# Patient Record
Sex: FEMALE | Race: WHITE | Employment: UNEMPLOYED | URBAN - METROPOLITAN AREA
[De-identification: names, ages, dates, MRNs, and addresses within clinical notes are randomized per-mention and may not be internally consistent; named-entity substitution may affect disease eponyms.]

---

## 2020-01-17 ENCOUNTER — HOSPITAL ENCOUNTER (OUTPATIENT)
Dept: CT IMAGING | Age: 50
Discharge: HOME | End: 2020-01-19
Payer: COMMERCIAL

## 2020-01-17 ENCOUNTER — HOSPITAL ENCOUNTER (OUTPATIENT)
Dept: INTERVENTIONAL RADIOLOGY/VASCULAR | Age: 50
Discharge: HOME | End: 2020-01-19
Payer: COMMERCIAL

## 2020-01-17 VITALS
BODY MASS INDEX: 38.57 KG/M2 | TEMPERATURE: 97.7 F | OXYGEN SATURATION: 100 % | SYSTOLIC BLOOD PRESSURE: 129 MMHG | HEART RATE: 76 BPM | HEIGHT: 66 IN | RESPIRATION RATE: 18 BRPM | DIASTOLIC BLOOD PRESSURE: 63 MMHG | WEIGHT: 240 LBS

## 2020-01-17 PROCEDURE — 6370000000 HC RX 637 (ALT 250 FOR IP): Performed by: RADIOLOGY

## 2020-01-17 PROCEDURE — 7100000031 HC ASPR PHASE II RECOVERY - ADDTL 15 MIN

## 2020-01-17 PROCEDURE — 62284 INJECTION FOR MYELOGRAM: CPT | Performed by: RADIOLOGY

## 2020-01-17 PROCEDURE — 6360000004 HC RX CONTRAST MEDICATION: Performed by: RADIOLOGY

## 2020-01-17 PROCEDURE — 7100000030 HC ASPR PHASE II RECOVERY - FIRST 15 MIN

## 2020-01-17 PROCEDURE — 72126 CT NECK SPINE W/DYE: CPT

## 2020-01-17 PROCEDURE — 72132 CT LUMBAR SPINE W/DYE: CPT

## 2020-01-17 PROCEDURE — 2709999900 IR MYELOGRAM 2 OR MORE REGIONS

## 2020-01-17 RX ORDER — ACETAMINOPHEN 325 MG/1
650 TABLET ORAL EVERY 4 HOURS PRN
Status: DISCONTINUED | OUTPATIENT
Start: 2020-01-17 | End: 2020-01-20 | Stop reason: HOSPADM

## 2020-01-17 RX ORDER — OXYCODONE HYDROCHLORIDE AND ACETAMINOPHEN 5; 325 MG/1; MG/1
1 TABLET ORAL EVERY 4 HOURS PRN
Status: DISCONTINUED | OUTPATIENT
Start: 2020-01-17 | End: 2020-01-20 | Stop reason: HOSPADM

## 2020-01-17 RX ORDER — OXYCODONE HYDROCHLORIDE AND ACETAMINOPHEN 5; 325 MG/1; MG/1
1 TABLET ORAL EVERY 4 HOURS PRN
COMMUNITY

## 2020-01-17 RX ADMIN — IOPAMIDOL 10 ML: 612 INJECTION, SOLUTION INTRATHECAL at 11:04

## 2020-01-17 RX ADMIN — OXYCODONE HYDROCHLORIDE AND ACETAMINOPHEN 1 TABLET: 5; 325 TABLET ORAL at 11:38

## 2020-01-17 ASSESSMENT — PAIN DESCRIPTION - DESCRIPTORS
DESCRIPTORS: ACHING;THROBBING
DESCRIPTORS: ACHING;THROBBING
DESCRIPTORS: ACHING;THROBBING;SHOOTING
DESCRIPTORS: ACHING;THROBBING

## 2020-01-17 ASSESSMENT — PAIN - FUNCTIONAL ASSESSMENT: PAIN_FUNCTIONAL_ASSESSMENT: 0-10

## 2020-01-17 ASSESSMENT — PAIN SCALES - GENERAL
PAINLEVEL_OUTOF10: 9
PAINLEVEL_OUTOF10: 8
PAINLEVEL_OUTOF10: 9
PAINLEVEL_OUTOF10: 8

## 2020-01-17 ASSESSMENT — PAIN DESCRIPTION - PAIN TYPE
TYPE: CHRONIC PAIN

## 2020-01-17 ASSESSMENT — PAIN DESCRIPTION - LOCATION
LOCATION: BACK;BUTTOCKS;NECK
LOCATION: BACK;BUTTOCKS;NECK

## 2020-01-17 NOTE — H&P
HISTORY and [id number]       NAME:  Ivania Mendosa  MRN: [id number]   YOB: 1970   Date: 1/17/2020   Age: 54 y.o. Gender: female       COMPLAINT AND PRESENT HISTORY:     Ivania Mendosa is 54 y.o.,  female, here for IR Myelogram, Cervical and Lumbar. Pt has history of cervical and lumbar degenerative disc disease since age 29. Denies injury, fall or trauma. Pt had lumbar spine surgery x2 (2006 and 2008) with hardware in place. Pt reports not able to have MRI due to hardware. Pt has stimulator in place that she reports is non-functioning to right lumbar area. Pt reports lumbar pain since age 29 with cervical pain beginning about one year ago and has steadily worsened. Rating pain 8/10. Pt takes oxycodone and oxymorphone with moderate relief of pain. Pain is aggravated with any activity. Pain radiates to bilateral shoulders. Pt c/o right sciatic pain intermittently worse with sitting for long periods. Has tried injections and physical therapy with minimal relief. Pt goes to pain management clinic. Pt reports she is not able to work due to pain. No rash, redness or swelling. Denies current chest pain/pressure, palpitations, SOB, recent URI, nausea, vomiting, diarrhea, constipation, fever or chills. PAST MEDICAL HISTORY     Past Medical History:   Diagnosis Date    DDD (degenerative disc disease), cervical     DDD (degenerative disc disease), lumbar     Hypertension     no meds currently       SURGICAL HISTORY       Past Surgical History:   Procedure Laterality Date    BACK SURGERY      lumbar x 2, hardware, cages, fusion    CHOLECYSTECTOMY      TONSILLECTOMY AND ADENOIDECTOMY      TUBAL LIGATION         FAMILY HISTORY     History reviewed. No pertinent family history.     SOCIAL HISTORY       Social History     Socioeconomic History    Marital status:      Spouse name: None    Number of children: None    Years of education: None    Highest education level: None   Occupational History    None   Social Needs    Financial resource strain: None    Food insecurity:     Worry: None     Inability: None    Transportation needs:     Medical: None     Non-medical: None   Tobacco Use    Smoking status: Current Every Day Smoker     Packs/day: 0.50     Years: 30.00     Pack years: 15.00    Smokeless tobacco: Never Used   Substance and Sexual Activity    Alcohol use: Yes     Comment: very rare    Drug use: Never    Sexual activity: None   Lifestyle    Physical activity:     Days per week: None     Minutes per session: None    Stress: None   Relationships    Social connections:     Talks on phone: None     Gets together: None     Attends Buddhism service: None     Active member of club or organization: None     Attends meetings of clubs or organizations: None     Relationship status: None    Intimate partner violence:     Fear of current or ex partner: None     Emotionally abused: None     Physically abused: None     Forced sexual activity: None   Other Topics Concern    None   Social History Narrative    None        REVIEW OF SYSTEMS      No Known Allergies    Current Outpatient Medications on File Prior to Encounter   Medication Sig Dispense Refill    oxyCODONE-acetaminophen (PERCOCET) 5-325 MG per tablet Take 1 tablet by mouth every 4 hours as needed for Pain.  OXYMORPHONE HCL PO Take by mouth 2 times daily       No current facility-administered medications on file prior to encounter. Negative except for what is mentioned in the HPI. GENERAL PHYSICAL EXAM     Vitals: BP (!) 152/100   Pulse 97   Temp 98.2 °F (36.8 °C)   Resp 18   Ht 5' 6\" (1.676 m)   Wt 240 lb (108.9 kg)   SpO2 99%   BMI 38.74 kg/m²  Body mass index is 38.74 kg/m². GENERAL APPEARANCE:   Heriberto Curtis is 54 y.o.,  female, moderately obese, nourished, conscious, alert and anxious. Appears to be in moderate pain.  Does not appear to be in acute

## 2020-01-17 NOTE — OP NOTE
Brief Postoperative Note    Dannielle No  YOB: 1970  [id number]    Pre-operative Diagnosis: back pain    Post-operative Diagnosis: Same    Procedure: myelogram    Anesthesia: Local   Surgeons/Assistants: Watson Nelson MD     Estimated Blood Loss: minimal    Complications: none immediate    Specimens: were not obtained      Electronically signed by Watson Nelson MD on 1/17/2020 at 11:01 AM

## 2020-01-17 NOTE — PROGRESS NOTES
Dr Diya Le arrives, 1% lidocaine injected to lower back and needle inserted under fluoro. 10 ml of Contrast injected. Fluoro images taken. Needle removed and band aid applied. Fluoro images taken and pt placed back onto stretcher cart flat and transported to CT.

## 2020-05-05 ENCOUNTER — HOSPITAL ENCOUNTER (OUTPATIENT)
Dept: CT IMAGING | Age: 50
Discharge: HOME | End: 2020-05-07
Payer: COMMERCIAL

## 2020-05-05 PROCEDURE — 70486 CT MAXILLOFACIAL W/O DYE: CPT

## 2020-05-29 ENCOUNTER — HOSPITAL ENCOUNTER (OUTPATIENT)
Dept: CT IMAGING | Age: 50
Discharge: HOME | End: 2020-05-31
Payer: COMMERCIAL

## 2020-05-29 PROCEDURE — 70486 CT MAXILLOFACIAL W/O DYE: CPT

## 2020-07-17 ENCOUNTER — HOSPITAL ENCOUNTER (OUTPATIENT)
Age: 50
Setting detail: SPECIMEN
Discharge: HOME | End: 2020-07-17
Payer: COMMERCIAL

## 2020-07-21 LAB — SURGICAL PATHOLOGY REPORT: NORMAL

## 2024-05-15 ENCOUNTER — HOSPITAL ENCOUNTER (OUTPATIENT)
Dept: PREADMISSION TESTING | Age: 54
Discharge: HOME | End: 2024-05-15
Payer: COMMERCIAL

## 2024-05-15 VITALS
BODY MASS INDEX: 39.37 KG/M2 | RESPIRATION RATE: 18 BRPM | HEIGHT: 66 IN | DIASTOLIC BLOOD PRESSURE: 78 MMHG | TEMPERATURE: 97.5 F | SYSTOLIC BLOOD PRESSURE: 148 MMHG | OXYGEN SATURATION: 96 % | HEART RATE: 91 BPM | WEIGHT: 245 LBS

## 2024-05-15 LAB
ANION GAP SERPL CALCULATED.3IONS-SCNC: 10 MMOL/L (ref 9–17)
BUN SERPL-MCNC: 12 MG/DL (ref 6–20)
BUN/CREAT SERPL: 17 (ref 9–20)
CALCIUM SERPL-MCNC: 9.4 MG/DL (ref 8.6–10.4)
CHLORIDE SERPL-SCNC: 104 MMOL/L (ref 98–107)
CO2 SERPL-SCNC: 27 MMOL/L (ref 20–31)
CREAT SERPL-MCNC: 0.7 MG/DL (ref 0.5–0.9)
ERYTHROCYTE [DISTWIDTH] IN BLOOD BY AUTOMATED COUNT: 13.6 % (ref 11.8–14.4)
GFR, ESTIMATED: >90 ML/MIN/1.73M2
GLUCOSE SERPL-MCNC: 140 MG/DL (ref 70–99)
HCT VFR BLD AUTO: 44.3 % (ref 36.3–47.1)
HGB BLD-MCNC: 14.1 G/DL (ref 11.9–15.1)
MCH RBC QN AUTO: 29.9 PG (ref 25.2–33.5)
MCHC RBC AUTO-ENTMCNC: 31.8 G/DL (ref 28.4–34.8)
MCV RBC AUTO: 93.9 FL (ref 82.6–102.9)
NRBC BLD-RTO: 0 PER 100 WBC
PLATELET # BLD AUTO: 283 K/UL (ref 138–453)
PMV BLD AUTO: 10.6 FL (ref 8.1–13.5)
POTASSIUM SERPL-SCNC: 4.1 MMOL/L (ref 3.7–5.3)
RBC # BLD AUTO: 4.72 M/UL (ref 3.95–5.11)
SODIUM SERPL-SCNC: 141 MMOL/L (ref 135–144)
WBC OTHER # BLD: 7.4 K/UL (ref 3.5–11.3)

## 2024-05-15 PROCEDURE — 93005 ELECTROCARDIOGRAM TRACING: CPT | Performed by: ANESTHESIOLOGY

## 2024-05-15 PROCEDURE — 85027 COMPLETE CBC AUTOMATED: CPT

## 2024-05-15 PROCEDURE — 80048 BASIC METABOLIC PNL TOTAL CA: CPT

## 2024-05-15 PROCEDURE — 36415 COLL VENOUS BLD VENIPUNCTURE: CPT

## 2024-05-15 RX ORDER — METHOCARBAMOL 750 MG/1
1-2 TABLET, FILM COATED ORAL 2 TIMES DAILY
COMMUNITY

## 2024-05-15 RX ORDER — OXYCODONE HYDROCHLORIDE 15 MG/1
15 TABLET ORAL EVERY 4 HOURS PRN
COMMUNITY

## 2024-05-15 RX ORDER — OXYMETAZOLINE HYDROCHLORIDE 0.05 G/100ML
2 SPRAY NASAL ONCE
OUTPATIENT
Start: 2024-05-29 | End: 2024-05-31

## 2024-05-15 ASSESSMENT — PAIN DESCRIPTION - FREQUENCY: FREQUENCY: CONTINUOUS

## 2024-05-15 ASSESSMENT — PAIN DESCRIPTION - PAIN TYPE: TYPE: CHRONIC PAIN

## 2024-05-15 ASSESSMENT — PAIN SCALES - GENERAL: PAINLEVEL_OUTOF10: 7

## 2024-05-15 ASSESSMENT — PAIN DESCRIPTION - LOCATION: LOCATION: BACK;NECK

## 2024-05-15 ASSESSMENT — PAIN DESCRIPTION - DESCRIPTORS: DESCRIPTORS: ACHING

## 2024-05-15 NOTE — PRE-PROCEDURE INSTRUCTIONS
ARRIVE AT THE HOSPITAL ON Wednesday, 5/29/24 Dr Mcpherson's office to notify the patient of arrival time    Once you enter the hospital lobby, take the elevators to the second floor.  Check-In is at the surgery registration desk.      Continue to take your home medications as you normally do up to and including the night before surgery with the exception of any blood thinning medications.      Blood Thinning Medications:  Please stop prescription blood thinning medications such as Apixaban (Eliquis); Clopidogrel (Plavix); Dabigatran (Pradaxa); Prasugrel (Effient); Rivaroxaban (Xarelto); Ticagrelor (Brilinta); Warfarin (Coumadin) only as directed by your surgeon and/or the prescribing physician    Some common examples of other medications that can thin your blood are: Aspirin, Ibuprofen (Advil, Motrin), Naproxen (Aleve), Meloxicam (Mobic), Celecoxib (Celebrex),Excedrin, Fish Oil, many Herbal Supplements.  These medications should usually be stopped at least 7 days prior to surgery.    None    Tylenol is OK to take for pain the week prior to surgery.    Failure to stop certain medications may interfere with your scheduled surgery.    If you receive instructions from your surgeon regarding what medications to stop prior to surgery, please follow those specific instructions.      If You Have Diabetes:  Do not take any of your diabetic medications, (injectables or by mouth) the morning of surgery unless otherwise instructed by the doctor who manages your diabetes. If you are taking insulin, contact the doctor the manages your diabetes for instructions about any changes to your insulin dosages the day before surgery.        Please take the following medication(s) the day of surgery with a small sip of water:  Patient normally takes methocarbamol and oxycodone together when she gets up in the morning or she is not able to move. Instructed to take as normal    Please use your inhaler(s) if needed and bring your inhaler(s)  from home the day of surgery      PREPARING FOR YOUR SURGERY:     Before surgery, you can play an important role in your own health. Because skin is not sterile, we need to be sure that your skin is as free of germs as possible before surgery by carefully washing before surgery.  Preparing or “prepping” skin before surgery can reduce the risk of a “surgical site infection.”  Do not shave the area of your body where your surgery will be performed unless you received specific permission from your physician.    Preoperative Bathing Instructions  Bathe or shower the night before and morning of surgery. Use antibacterial soap- Dial  Wear clean clothing after bathing.  Shampoo hair before surgery  Keep nails clean    Do not apply lotion/creams/oils; cosmetics; perfumes/aftershave; deodorant; alcohol based hair or skin products the day of surgery.  Do not shave the area of your body where your surgery will be performed unless you receive specific permission from your surgeon.       Patient Instructions:    If you are having any type of anesthesia you are to have nothing to eat or drink after midnight the night before your surgery.  This includes gum, hard candy, mints, water or smoking or chewing tobacco.  The only exception to this is a small sip of water to take the medications listed above on the morning of surgery.  No alcoholic beverages for 24 hours prior to surgery.    You may brush your teeth but do not swallow the water.    Bring your eyeglasses and case with you.  No contacts are to be worn the day of surgery.  You also may bring your hearing aids.  Most surgical procedures involving anesthesia will require that you remove your dentures prior to surgery.    If you are on C-PAP or Bi-PAP at home and plan on staying in the hospital overnight for your surgery please bring the machine with you.    Do not wear any jewelry or body piercings day of surgery.  No nail polish on the operative extremity (arm/leg

## 2024-05-16 LAB
EKG ATRIAL RATE: 78 BPM
EKG P AXIS: 70 DEGREES
EKG P-R INTERVAL: 160 MS
EKG Q-T INTERVAL: 380 MS
EKG QRS DURATION: 88 MS
EKG QTC CALCULATION (BAZETT): 433 MS
EKG R AXIS: 53 DEGREES
EKG T AXIS: 47 DEGREES
EKG VENTRICULAR RATE: 78 BPM

## 2024-05-29 ENCOUNTER — ANESTHESIA (OUTPATIENT)
Dept: OPERATING ROOM | Age: 54
End: 2024-05-29
Payer: COMMERCIAL

## 2024-05-29 ENCOUNTER — ANESTHESIA EVENT (OUTPATIENT)
Dept: OPERATING ROOM | Age: 54
End: 2024-05-29
Payer: COMMERCIAL

## 2024-05-29 ENCOUNTER — HOSPITAL ENCOUNTER (OUTPATIENT)
Age: 54
Setting detail: OUTPATIENT SURGERY
Discharge: HOME | End: 2024-05-29
Attending: OTOLARYNGOLOGY | Admitting: OTOLARYNGOLOGY
Payer: COMMERCIAL

## 2024-05-29 VITALS
BODY MASS INDEX: 39.37 KG/M2 | SYSTOLIC BLOOD PRESSURE: 152 MMHG | WEIGHT: 245 LBS | HEIGHT: 66 IN | RESPIRATION RATE: 13 BRPM | TEMPERATURE: 97.9 F | HEART RATE: 82 BPM | OXYGEN SATURATION: 94 % | DIASTOLIC BLOOD PRESSURE: 80 MMHG

## 2024-05-29 DIAGNOSIS — J32.3 CHRONIC SPHENOIDAL SINUSITIS: ICD-10-CM

## 2024-05-29 DIAGNOSIS — G89.18 POSTOPERATIVE PAIN: Primary | ICD-10-CM

## 2024-05-29 DIAGNOSIS — J33.9 NASAL POLYP: ICD-10-CM

## 2024-05-29 DIAGNOSIS — J32.2 SINUSITIS CHRONIC, ETHMOIDAL: ICD-10-CM

## 2024-05-29 DIAGNOSIS — J32.1 CHRONIC FRONTAL SINUSITIS: ICD-10-CM

## 2024-05-29 DIAGNOSIS — J32.0 CHRONIC MAXILLARY SINUSITIS: ICD-10-CM

## 2024-05-29 LAB — HCG, PREGNANCY URINE (POC): NEGATIVE

## 2024-05-29 PROCEDURE — 6360000002 HC RX W HCPCS: Performed by: OTOLARYNGOLOGY

## 2024-05-29 PROCEDURE — 3600000012 HC SURGERY LEVEL 2 ADDTL 15MIN: Performed by: OTOLARYNGOLOGY

## 2024-05-29 PROCEDURE — 88311 DECALCIFY TISSUE: CPT

## 2024-05-29 PROCEDURE — 3600000002 HC SURGERY LEVEL 2 BASE: Performed by: OTOLARYNGOLOGY

## 2024-05-29 PROCEDURE — 6370000000 HC RX 637 (ALT 250 FOR IP): Performed by: OTOLARYNGOLOGY

## 2024-05-29 PROCEDURE — 7100000010 HC PHASE II RECOVERY - FIRST 15 MIN: Performed by: OTOLARYNGOLOGY

## 2024-05-29 PROCEDURE — 6360000002 HC RX W HCPCS: Performed by: ANESTHESIOLOGY

## 2024-05-29 PROCEDURE — 2500000003 HC RX 250 WO HCPCS: Performed by: OTOLARYNGOLOGY

## 2024-05-29 PROCEDURE — 3700000001 HC ADD 15 MINUTES (ANESTHESIA): Performed by: OTOLARYNGOLOGY

## 2024-05-29 PROCEDURE — 7100000000 HC PACU RECOVERY - FIRST 15 MIN: Performed by: OTOLARYNGOLOGY

## 2024-05-29 PROCEDURE — 3700000000 HC ANESTHESIA ATTENDED CARE: Performed by: OTOLARYNGOLOGY

## 2024-05-29 PROCEDURE — 6360000002 HC RX W HCPCS: Performed by: SPECIALIST

## 2024-05-29 PROCEDURE — 6370000000 HC RX 637 (ALT 250 FOR IP): Performed by: ANESTHESIOLOGY

## 2024-05-29 PROCEDURE — 7100000001 HC PACU RECOVERY - ADDTL 15 MIN: Performed by: OTOLARYNGOLOGY

## 2024-05-29 PROCEDURE — 2500000003 HC RX 250 WO HCPCS: Performed by: SPECIALIST

## 2024-05-29 PROCEDURE — 2580000003 HC RX 258: Performed by: ANESTHESIOLOGY

## 2024-05-29 PROCEDURE — 7100000011 HC PHASE II RECOVERY - ADDTL 15 MIN: Performed by: OTOLARYNGOLOGY

## 2024-05-29 PROCEDURE — 81025 URINE PREGNANCY TEST: CPT

## 2024-05-29 PROCEDURE — 2720000010 HC SURG SUPPLY STERILE: Performed by: OTOLARYNGOLOGY

## 2024-05-29 PROCEDURE — 88305 TISSUE EXAM BY PATHOLOGIST: CPT

## 2024-05-29 PROCEDURE — 2709999900 HC NON-CHARGEABLE SUPPLY: Performed by: OTOLARYNGOLOGY

## 2024-05-29 RX ORDER — MEPERIDINE HYDROCHLORIDE 50 MG/ML
12.5 INJECTION INTRAMUSCULAR; INTRAVENOUS; SUBCUTANEOUS EVERY 5 MIN PRN
Status: DISCONTINUED | OUTPATIENT
Start: 2024-05-29 | End: 2024-05-29 | Stop reason: HOSPADM

## 2024-05-29 RX ORDER — METOCLOPRAMIDE HYDROCHLORIDE 5 MG/ML
10 INJECTION INTRAMUSCULAR; INTRAVENOUS
Status: DISCONTINUED | OUTPATIENT
Start: 2024-05-29 | End: 2024-05-29 | Stop reason: HOSPADM

## 2024-05-29 RX ORDER — FENTANYL CITRATE 50 UG/ML
INJECTION, SOLUTION INTRAMUSCULAR; INTRAVENOUS PRN
Status: DISCONTINUED | OUTPATIENT
Start: 2024-05-29 | End: 2024-05-29 | Stop reason: SDUPTHER

## 2024-05-29 RX ORDER — ONDANSETRON 2 MG/ML
INJECTION INTRAMUSCULAR; INTRAVENOUS PRN
Status: DISCONTINUED | OUTPATIENT
Start: 2024-05-29 | End: 2024-05-29 | Stop reason: SDUPTHER

## 2024-05-29 RX ORDER — OXYCODONE HYDROCHLORIDE 5 MG/1
5 TABLET ORAL
Status: COMPLETED | OUTPATIENT
Start: 2024-05-29 | End: 2024-05-29

## 2024-05-29 RX ORDER — LIDOCAINE HYDROCHLORIDE 10 MG/ML
1 INJECTION, SOLUTION EPIDURAL; INFILTRATION; INTRACAUDAL; PERINEURAL
Status: DISCONTINUED | OUTPATIENT
Start: 2024-05-29 | End: 2024-05-29 | Stop reason: HOSPADM

## 2024-05-29 RX ORDER — NALOXONE HYDROCHLORIDE 0.4 MG/ML
INJECTION, SOLUTION INTRAMUSCULAR; INTRAVENOUS; SUBCUTANEOUS PRN
Status: DISCONTINUED | OUTPATIENT
Start: 2024-05-29 | End: 2024-05-29 | Stop reason: HOSPADM

## 2024-05-29 RX ORDER — MIDAZOLAM HYDROCHLORIDE 1 MG/ML
INJECTION INTRAMUSCULAR; INTRAVENOUS PRN
Status: DISCONTINUED | OUTPATIENT
Start: 2024-05-29 | End: 2024-05-29 | Stop reason: SDUPTHER

## 2024-05-29 RX ORDER — LIDOCAINE HYDROCHLORIDE AND EPINEPHRINE 10; 10 MG/ML; UG/ML
INJECTION, SOLUTION INFILTRATION; PERINEURAL PRN
Status: DISCONTINUED | OUTPATIENT
Start: 2024-05-29 | End: 2024-05-29 | Stop reason: ALTCHOICE

## 2024-05-29 RX ORDER — SODIUM CHLORIDE, SODIUM LACTATE, POTASSIUM CHLORIDE, CALCIUM CHLORIDE 600; 310; 30; 20 MG/100ML; MG/100ML; MG/100ML; MG/100ML
INJECTION, SOLUTION INTRAVENOUS CONTINUOUS
Status: DISCONTINUED | OUTPATIENT
Start: 2024-05-29 | End: 2024-05-29 | Stop reason: HOSPADM

## 2024-05-29 RX ORDER — SODIUM CHLORIDE 9 MG/ML
INJECTION, SOLUTION INTRAVENOUS PRN
Status: DISCONTINUED | OUTPATIENT
Start: 2024-05-29 | End: 2024-05-29 | Stop reason: HOSPADM

## 2024-05-29 RX ORDER — OXYMETAZOLINE HYDROCHLORIDE 0.05 G/100ML
2 SPRAY NASAL ONCE
Status: COMPLETED | OUTPATIENT
Start: 2024-05-29 | End: 2024-05-29

## 2024-05-29 RX ORDER — HYDROMORPHONE HYDROCHLORIDE 1 MG/ML
0.5 INJECTION, SOLUTION INTRAMUSCULAR; INTRAVENOUS; SUBCUTANEOUS EVERY 5 MIN PRN
Status: DISCONTINUED | OUTPATIENT
Start: 2024-05-29 | End: 2024-05-29 | Stop reason: HOSPADM

## 2024-05-29 RX ORDER — PROPOFOL 10 MG/ML
INJECTION, EMULSION INTRAVENOUS PRN
Status: DISCONTINUED | OUTPATIENT
Start: 2024-05-29 | End: 2024-05-29 | Stop reason: SDUPTHER

## 2024-05-29 RX ORDER — ROCURONIUM BROMIDE 10 MG/ML
INJECTION, SOLUTION INTRAVENOUS PRN
Status: DISCONTINUED | OUTPATIENT
Start: 2024-05-29 | End: 2024-05-29 | Stop reason: SDUPTHER

## 2024-05-29 RX ORDER — SODIUM CHLORIDE 0.9 % (FLUSH) 0.9 %
5-40 SYRINGE (ML) INJECTION EVERY 12 HOURS SCHEDULED
Status: DISCONTINUED | OUTPATIENT
Start: 2024-05-29 | End: 2024-05-29 | Stop reason: HOSPADM

## 2024-05-29 RX ORDER — LIDOCAINE HYDROCHLORIDE 20 MG/ML
INJECTION, SOLUTION EPIDURAL; INFILTRATION; INTRACAUDAL; PERINEURAL PRN
Status: DISCONTINUED | OUTPATIENT
Start: 2024-05-29 | End: 2024-05-29 | Stop reason: SDUPTHER

## 2024-05-29 RX ORDER — SODIUM CHLORIDE 9 MG/ML
INJECTION, SOLUTION INTRAVENOUS CONTINUOUS
Status: DISCONTINUED | OUTPATIENT
Start: 2024-05-29 | End: 2024-05-29 | Stop reason: HOSPADM

## 2024-05-29 RX ORDER — SODIUM CHLORIDE 0.9 % (FLUSH) 0.9 %
5-40 SYRINGE (ML) INJECTION PRN
Status: DISCONTINUED | OUTPATIENT
Start: 2024-05-29 | End: 2024-05-29 | Stop reason: HOSPADM

## 2024-05-29 RX ORDER — FENTANYL CITRATE 50 UG/ML
25 INJECTION, SOLUTION INTRAMUSCULAR; INTRAVENOUS EVERY 5 MIN PRN
Status: DISCONTINUED | OUTPATIENT
Start: 2024-05-29 | End: 2024-05-29 | Stop reason: HOSPADM

## 2024-05-29 RX ORDER — OXYMETAZOLINE HYDROCHLORIDE 0.05 G/100ML
2 SPRAY NASAL ONCE
Status: DISCONTINUED | OUTPATIENT
Start: 2024-05-29 | End: 2024-05-29

## 2024-05-29 RX ORDER — DEXAMETHASONE SODIUM PHOSPHATE 10 MG/ML
INJECTION, SOLUTION INTRAMUSCULAR; INTRAVENOUS PRN
Status: DISCONTINUED | OUTPATIENT
Start: 2024-05-29 | End: 2024-05-29 | Stop reason: SDUPTHER

## 2024-05-29 RX ORDER — TRIAMCINOLONE ACETONIDE 40 MG/ML
INJECTION, SUSPENSION INTRA-ARTICULAR; INTRAMUSCULAR PRN
Status: DISCONTINUED | OUTPATIENT
Start: 2024-05-29 | End: 2024-05-29 | Stop reason: ALTCHOICE

## 2024-05-29 RX ORDER — DIPHENHYDRAMINE HYDROCHLORIDE 50 MG/ML
12.5 INJECTION INTRAMUSCULAR; INTRAVENOUS
Status: DISCONTINUED | OUTPATIENT
Start: 2024-05-29 | End: 2024-05-29 | Stop reason: HOSPADM

## 2024-05-29 RX ORDER — OXYCODONE HYDROCHLORIDE AND ACETAMINOPHEN 5; 325 MG/1; MG/1
1 TABLET ORAL EVERY 6 HOURS PRN
Qty: 28 TABLET | Refills: 0 | Status: SHIPPED | OUTPATIENT
Start: 2024-05-29 | End: 2024-06-05

## 2024-05-29 RX ORDER — CLARITHROMYCIN 500 MG/1
500 TABLET, COATED ORAL 2 TIMES DAILY
Qty: 20 TABLET | Refills: 0 | Status: SHIPPED | OUTPATIENT
Start: 2024-05-29 | End: 2024-06-08

## 2024-05-29 RX ORDER — PROCHLORPERAZINE EDISYLATE 5 MG/ML
10 INJECTION INTRAMUSCULAR; INTRAVENOUS
Status: DISCONTINUED | OUTPATIENT
Start: 2024-05-29 | End: 2024-05-29 | Stop reason: HOSPADM

## 2024-05-29 RX ADMIN — MIDAZOLAM 2 MG: 1 INJECTION INTRAMUSCULAR; INTRAVENOUS at 08:38

## 2024-05-29 RX ADMIN — ONDANSETRON 4 MG: 2 INJECTION INTRAMUSCULAR; INTRAVENOUS at 09:15

## 2024-05-29 RX ADMIN — HYDROMORPHONE HYDROCHLORIDE 0.5 MG: 1 INJECTION, SOLUTION INTRAMUSCULAR; INTRAVENOUS; SUBCUTANEOUS at 10:32

## 2024-05-29 RX ADMIN — FENTANYL CITRATE 100 MCG: 50 INJECTION INTRAMUSCULAR; INTRAVENOUS at 08:43

## 2024-05-29 RX ADMIN — FENTANYL CITRATE 25 MCG: 50 INJECTION INTRAMUSCULAR; INTRAVENOUS at 09:27

## 2024-05-29 RX ADMIN — ROCURONIUM BROMIDE 50 MG: 10 INJECTION, SOLUTION INTRAVENOUS at 08:43

## 2024-05-29 RX ADMIN — Medication 2000 MG: at 08:53

## 2024-05-29 RX ADMIN — PROPOFOL 200 MG: 10 INJECTION, EMULSION INTRAVENOUS at 08:43

## 2024-05-29 RX ADMIN — SUGAMMADEX 200 MG: 100 INJECTION, SOLUTION INTRAVENOUS at 09:32

## 2024-05-29 RX ADMIN — OXYCODONE HYDROCHLORIDE 5 MG: 5 TABLET ORAL at 10:49

## 2024-05-29 RX ADMIN — DEXAMETHASONE SODIUM PHOSPHATE 10 MG: 10 INJECTION INTRAMUSCULAR; INTRAVENOUS at 08:44

## 2024-05-29 RX ADMIN — HYDROMORPHONE HYDROCHLORIDE 0.5 MG: 1 INJECTION, SOLUTION INTRAMUSCULAR; INTRAVENOUS; SUBCUTANEOUS at 10:22

## 2024-05-29 RX ADMIN — SODIUM CHLORIDE, POTASSIUM CHLORIDE, SODIUM LACTATE AND CALCIUM CHLORIDE: 600; 310; 30; 20 INJECTION, SOLUTION INTRAVENOUS at 07:07

## 2024-05-29 RX ADMIN — Medication 2 SPRAY: at 07:32

## 2024-05-29 RX ADMIN — LIDOCAINE HYDROCHLORIDE 100 MG: 20 INJECTION, SOLUTION EPIDURAL; INFILTRATION; INTRACAUDAL; PERINEURAL at 08:43

## 2024-05-29 ASSESSMENT — PAIN - FUNCTIONAL ASSESSMENT
PAIN_FUNCTIONAL_ASSESSMENT: 0-10
PAIN_FUNCTIONAL_ASSESSMENT: FACE, LEGS, ACTIVITY, CRY, AND CONSOLABILITY (FLACC)

## 2024-05-29 ASSESSMENT — PAIN DESCRIPTION - DESCRIPTORS
DESCRIPTORS: THROBBING

## 2024-05-29 ASSESSMENT — PAIN SCALES - GENERAL
PAINLEVEL_OUTOF10: 8
PAINLEVEL_OUTOF10: 8
PAINLEVEL_OUTOF10: 9
PAINLEVEL_OUTOF10: 6

## 2024-05-29 ASSESSMENT — LIFESTYLE VARIABLES: SMOKING_STATUS: 1

## 2024-05-29 ASSESSMENT — PAIN DESCRIPTION - LOCATION
LOCATION: NOSE

## 2024-05-29 NOTE — ANESTHESIA PRE PROCEDURE
Department of Anesthesiology  Preprocedure Note       Name:  Dannielle Bustillo   Age:  53 y.o.  :  1970                                          MRN:  [id number]         Date:  2024      Surgeon: Surgeon(s):  Monet Mcpherson MD    Procedure: Procedure(s):  SINUS ENDOSCOPY FUNCTIONAL SURGERY- WITH NAVIGATION- ETHMOIDECTOMY, MAXILLARY ANTROSTOMY, FRONTAL SINUS EXPLORATION, SPHENOIDOTOMY, POLPYTECTOMY    Medications prior to admission:   Prior to Admission medications    Medication Sig Start Date End Date Taking? Authorizing Provider   oxyCODONE (OXY-IR) 15 MG immediate release tablet Take 1 tablet by mouth every 4 hours as needed for Pain.    ProviderCedrick MD   methocarbamol (ROBAXIN) 750 MG tablet Take 1-2 tablets by mouth in the morning and at bedtime    ProviderCedrick MD       Current medications:    Current Facility-Administered Medications   Medication Dose Route Frequency Provider Last Rate Last Admin   • lidocaine PF 1 % injection 1 mL  1 mL IntraDERmal Once PRN Alta Bergeron MD       • 0.9 % sodium chloride infusion   IntraVENous Continuous Alta Bergeron MD       • lactated ringers IV soln infusion   IntraVENous Continuous Alta Bergeron  mL/hr at 24 0707 New Bag at 24 0707   • sodium chloride flush 0.9 % injection 5-40 mL  5-40 mL IntraVENous 2 times per day Alta Bergeron MD       • sodium chloride flush 0.9 % injection 5-40 mL  5-40 mL IntraVENous PRN Alta Bergeron MD       • 0.9 % sodium chloride infusion   IntraVENous PRN Alta Bergeron MD       • ceFAZolin (ANCEF) 2000 mg in 0.9% sodium chloride 50 mL IVPB  2,000 mg IntraVENous Once Monet Mcpherson MD           Allergies:  No Known Allergies    Problem List:  There is no problem list on file for this patient.      Past Medical History:        Diagnosis Date   • DDD (degenerative disc disease), cervical    • DDD (degenerative disc disease), lumbar    • Hypertension     Resolved, no meds currently   • Loss of

## 2024-05-29 NOTE — DISCHARGE INSTRUCTIONS
products.  Avoid travel out of your home area until healing is completed.    Diet  Eat a light diet over the first few days following surgery.  Drink plenty of fluids  Many patients experience some nausea and vomiting after general anesthesia.  If this occurs, restrict yourself to fluids for 24 to 48 hours.  If nausea or vomiting occurs longer than 24 to 48 hours call the office.    Medications  Most patients are given medications for the following:  An antibiotic - use as directed until the prescription is finished.  For pain - Use as directed, only as needed.  You may use Tylenol.  DO NOT use aspirin or aspirin containing products. Or non-steriodal anti-inflammatory medications.  A nasal decongestant spray - Use this as directed for 5 days only.  For fever - do not use aspirin!  Use Tylenol if needed.  For constipation - Use a stool softener and mild laxative, as needed to avoid straining.  Occasionally a short burst of steroids (prednisone) will be prescribed.    Nasal Irrigations  Begin irrigating the nose when instructed to do so by the physician, using the separate instructions for nasal irrigations.    CALL US IF YOU HAVE ANY OF THE FOLLOWING  Temperature of over 102 F  Visual problems or swelling or bulging of the eyes  Neck stiffness with headaches and fever  Excessive drowsiness or confusion  Profuse nosebleed you cant control.

## 2024-05-29 NOTE — H&P
History and Physical Service   [facility]    HISTORY AND PHYSICAL EXAMINATION            Date of Evaluation: 5/29/2024  Patient name:  Dannielle Bustillo  MRN:   [id number]  YOB: 1970  PCP:    Gil Black MD    History Obtained From:     Patient, medical records    History of Present Illness:     This is Dannielle Bustillo a 53 y.o. female who presents today for a SINUS ENDOSCOPY FUNCTIONAL SURGERY- WITH NAVIGATION- ETHMOIDECTOMY, MAXILLARY ANTROSTOMY, FRONTAL SINUS EXPLORATION, SPHENOIDOTOMY, POLPYTECTOMY by Monet Mcpherson MD for Chronic maxillary sinusitis; Sinusitis chronic, ethmoidal; Chronic frontal*. Patient has history of chronic sinusitis and has had multiple previous sinus surgeries and debridements with last being in September 2022 in the operating room and one in an outpatient setting approximately one year ago. Currently her chief complaint is complete loss of taste and smell. Denies fever, chills, shortness of breath, cough, congestion, wheezing, chest pain, open sores or wounds. Denies hx of diabetes. Denies any current blood thinning medications.     Past Medical History:     Past Medical History:   Diagnosis Date    DDD (degenerative disc disease), cervical     DDD (degenerative disc disease), lumbar     Hypertension     Resolved, no meds currently    Loss of perception for taste     loss of smell    Nasal congestion     Nasal polyps     Spinal cord stimulator status     implanted        Past Surgical History:     Past Surgical History:   Procedure Laterality Date    BACK SURGERY      lumbar x 2, hardware, cages, fusion    CHOLECYSTECTOMY      NASAL POLYP SURGERY      had at least 4 nasal surgeries to remove polyps    TONSILLECTOMY AND ADENOIDECTOMY      TUBAL LIGATION      WISDOM TOOTH EXTRACTION          Medications Prior to Admission:     Prior to Admission medications    Medication Sig Start Date End Date Taking? Authorizing Provider   oxyCODONE (OXY-IR) 15 MG

## 2024-05-29 NOTE — ANESTHESIA POSTPROCEDURE EVALUATION
Department of Anesthesiology  Postprocedure Note    Patient: Dannielle Bustillo  MRN: [id number]  YOB: 1970  Date of evaluation: 5/29/2024    Procedure Summary       Date: 05/29/24 Room / Location: [address]    Anesthesia Start: 0838 Anesthesia Stop: 0948    Procedure: SINUS ENDOSCOPY FUNCTIONAL SURGERY- WITH NAVIGATION- ETHMOIDECTOMY, MAXILLARY ANTROSTOMY, FRONTAL SINUS EXPLORATION, SPHENOIDOTOMY, POLPYTECTOMY (Nose) Diagnosis:       Chronic maxillary sinusitis      Sinusitis chronic, ethmoidal      Chronic frontal sinusitis      Chronic sphenoidal sinusitis      Nasal polyp      (Chronic maxillary sinusitis [J32.0])      (Sinusitis chronic, ethmoidal [J32.2])      (Chronic frontal sinusitis [J32.1])      (Chronic sphenoidal sinusitis [J32.3])      (Nasal polyp [J33.9])    Surgeons: Monet Mcpherson MD Responsible Provider: Laura Ordoñez MD    Anesthesia Type: General ASA Status: 2            Anesthesia Type: General    Aminah Phase I: Aminah Score: 10    Aminah Phase II: Aminah Score: 10    Anesthesia Post Evaluation    Patient location during evaluation: PACU  Patient participation: complete - patient participated  Level of consciousness: awake and alert  Airway patency: patent  Nausea & Vomiting: no nausea and no vomiting  Cardiovascular status: hemodynamically stable  Respiratory status: acceptable  Hydration status: euvolemic  Pain management: adequate    No notable events documented.

## 2024-05-29 NOTE — OP NOTE
identified in the preoperative holding area and brought to the operating room.  The patient was prepped and draped in usual fashion.  The navigation equipment was brought into the field and landmarks were mapped.  The accuracy was verified.  The sinonasal passages were injected with lidocaine with epinephrine.  Afrin was applied to the nasal passages.    A combination of  scopes were used to examine the sinonasal passages.  The backbiting, upbiting, and straight biting forceps were used in the sinus passages to remove tissue.  The microdebrider was also used.    The inferior turbinates on the right and left side were incised and a submucous  resection was done.  The inferior turbinates were then outfractured.      The right maxillary sinus was seen after an uncinectomy was performed.  The curved suction was inserted into the maxillary sinus to suction out the blood and mucus.  The diseased tissue was removed.  The anterior and posterior ethmoid air cells were then sequentially entered.  The diseased tissue and mucus was removed.  The face of the sphenoid was identified and the sinus was entered.  The diseased tissue was removed. The frontal sinus tract was identified and the diseases tissue was addressed.    The left maxillary sinus was seen after an uncinectomy was performed.  The curved suction was inserted into the maxillary sinus to suction out the blood and mucus.  The diseased tissue was removed.  The anterior and posterior ethmoid air cells were then sequentially entered.  The diseased tissue and mucus was removed.  The face of the sphenoid was identified and the sinus was entered.  The diseased tissue was removed. The frontal sinus tract was identified and the diseases tissue was addressed.    The sinus passages were packed with nasopore soaked in Kenalog 40mg.  Now the nasal airway was patent bilaterally. The patient tolerated the procedure well and was returned to the recovery room in satisfactory

## 2024-05-30 LAB — SURGICAL PATHOLOGY REPORT: NORMAL

## 2024-08-30 ENCOUNTER — TELEPHONE (OUTPATIENT)
Age: 54
End: 2024-08-30

## 2024-10-14 ENCOUNTER — PREP FOR PROCEDURE (OUTPATIENT)
Age: 54
End: 2024-10-14

## 2024-10-14 DIAGNOSIS — Z12.12 ENCOUNTER FOR SCREENING FOR COLORECTAL MALIGNANT NEOPLASM: ICD-10-CM

## 2024-10-14 DIAGNOSIS — Z12.11 ENCOUNTER FOR SCREENING FOR COLORECTAL MALIGNANT NEOPLASM: ICD-10-CM

## 2024-11-09 ENCOUNTER — HOSPITAL ENCOUNTER (INPATIENT)
Age: 54
LOS: 4 days | Discharge: HOME | DRG: 189 | End: 2024-11-14
Attending: EMERGENCY MEDICINE | Admitting: STUDENT IN AN ORGANIZED HEALTH CARE EDUCATION/TRAINING PROGRAM
Payer: COMMERCIAL

## 2024-11-09 ENCOUNTER — APPOINTMENT (OUTPATIENT)
Dept: GENERAL RADIOLOGY | Age: 54
DRG: 189 | End: 2024-11-09
Payer: COMMERCIAL

## 2024-11-09 ENCOUNTER — APPOINTMENT (OUTPATIENT)
Dept: CT IMAGING | Age: 54
DRG: 189 | End: 2024-11-09
Payer: COMMERCIAL

## 2024-11-09 DIAGNOSIS — J33.9 TRIAD ASTHMA: ICD-10-CM

## 2024-11-09 DIAGNOSIS — R00.0 TACHYCARDIA: ICD-10-CM

## 2024-11-09 DIAGNOSIS — R06.02 SHORTNESS OF BREATH: Primary | ICD-10-CM

## 2024-11-09 DIAGNOSIS — R06.09 DYSPNEA ON EXERTION: ICD-10-CM

## 2024-11-09 DIAGNOSIS — Z88.6 TRIAD ASTHMA: ICD-10-CM

## 2024-11-09 DIAGNOSIS — G47.30 SLEEP-RELATED BREATHING DISORDER: ICD-10-CM

## 2024-11-09 DIAGNOSIS — E66.9 OBESITY (BMI 35.0-39.9 WITHOUT COMORBIDITY): ICD-10-CM

## 2024-11-09 DIAGNOSIS — J33.9 NASAL POLYPOSIS: ICD-10-CM

## 2024-11-09 DIAGNOSIS — J45.909 TRIAD ASTHMA: ICD-10-CM

## 2024-11-09 LAB
ALBUMIN SERPL-MCNC: 4.1 G/DL (ref 3.5–5.2)
ALBUMIN/GLOB SERPL: 1.4 {RATIO} (ref 1–2.5)
ALP SERPL-CCNC: 103 U/L (ref 35–104)
ALT SERPL-CCNC: 44 U/L (ref 10–35)
ANION GAP SERPL CALCULATED.3IONS-SCNC: 13 MMOL/L (ref 9–16)
AST SERPL-CCNC: 41 U/L (ref 10–35)
BASOPHILS # BLD: 0 K/UL (ref 0–0.2)
BASOPHILS NFR BLD: 0 % (ref 0–2)
BILIRUB SERPL-MCNC: 0.2 MG/DL (ref 0–1.2)
BUN BLD-MCNC: 22 MG/DL (ref 8–26)
BUN SERPL-MCNC: 22 MG/DL (ref 6–20)
CA-I BLD-SCNC: 1.21 MMOL/L (ref 1.15–1.33)
CALCIUM SERPL-MCNC: 8.8 MG/DL (ref 8.6–10.4)
CHLORIDE BLD-SCNC: 109 MMOL/L (ref 98–107)
CHLORIDE SERPL-SCNC: 104 MMOL/L (ref 98–107)
CO2 BLD CALC-SCNC: 25 MMOL/L (ref 22–30)
CO2 SERPL-SCNC: 22 MMOL/L (ref 20–31)
CREAT SERPL-MCNC: 0.8 MG/DL (ref 0.6–0.9)
D DIMER PPP FEU-MCNC: 1.6 UG/ML FEU (ref 0–0.57)
EGFR, POC: >90 ML/MIN/1.73M2
EOSINOPHIL # BLD: 0 K/UL (ref 0–0.4)
EOSINOPHILS RELATIVE PERCENT: 0 % (ref 1–4)
ERYTHROCYTE [DISTWIDTH] IN BLOOD BY AUTOMATED COUNT: 14.5 % (ref 11.8–14.4)
GFR, ESTIMATED: 89 ML/MIN/1.73M2
GLUCOSE BLD-MCNC: 161 MG/DL (ref 74–100)
GLUCOSE SERPL-MCNC: 163 MG/DL (ref 74–99)
HCO3 VENOUS: 24.6 MMOL/L (ref 22–29)
HCT VFR BLD AUTO: 42 % (ref 36–46)
HCT VFR BLD AUTO: 42.1 % (ref 36.3–47.1)
HGB BLD-MCNC: 13.5 G/DL (ref 11.9–15.1)
IMM GRANULOCYTES # BLD AUTO: 0 K/UL (ref 0–0.3)
IMM GRANULOCYTES NFR BLD: 0 %
LACTIC ACID, WHOLE BLOOD: 1.7 MMOL/L (ref 0.7–2.1)
LYMPHOCYTES NFR BLD: 4.62 K/UL (ref 1–4.8)
LYMPHOCYTES RELATIVE PERCENT: 31 % (ref 24–44)
MCH RBC QN AUTO: 29.4 PG (ref 25.2–33.5)
MCHC RBC AUTO-ENTMCNC: 32.1 G/DL (ref 28.4–34.8)
MCV RBC AUTO: 91.7 FL (ref 82.6–102.9)
MONOCYTES NFR BLD: 1.04 K/UL (ref 0.1–0.8)
MONOCYTES NFR BLD: 7 % (ref 1–7)
MORPHOLOGY: NORMAL
NEGATIVE BASE EXCESS, VEN: 2 MMOL/L (ref 0–2)
NEUTROPHILS NFR BLD: 62 % (ref 36–66)
NEUTS SEG NFR BLD: 9.24 K/UL (ref 1.8–7.7)
NRBC BLD-RTO: 0 PER 100 WBC
O2 SAT, VEN: 92.9 % (ref 60–85)
PCO2 VENOUS: 47.8 MM HG (ref 41–51)
PH VENOUS: 7.32 (ref 7.32–7.43)
PLATELET # BLD AUTO: 341 K/UL (ref 138–453)
PMV BLD AUTO: 10.8 FL (ref 8.1–13.5)
PO2 VENOUS: 72.7 MM HG (ref 30–50)
POC ANION GAP: 10 MMOL/L (ref 7–16)
POC CREATININE: 0.7 MG/DL (ref 0.51–1.19)
POC HEMOGLOBIN (CALC): 14.2 G/DL (ref 12–16)
POC LACTIC ACID: 1.3 MMOL/L (ref 0.56–1.39)
POTASSIUM BLD-SCNC: 3.5 MMOL/L (ref 3.5–4.5)
POTASSIUM SERPL-SCNC: 3.8 MMOL/L (ref 3.7–5.3)
PROT SERPL-MCNC: 7 G/DL (ref 6.6–8.7)
RBC # BLD AUTO: 4.59 M/UL (ref 3.95–5.11)
SODIUM BLD-SCNC: 143 MMOL/L (ref 138–146)
SODIUM SERPL-SCNC: 139 MMOL/L (ref 136–145)
TROPONIN I SERPL HS-MCNC: <6 NG/L (ref 0–14)
WBC OTHER # BLD: 14.9 K/UL (ref 3.5–11.3)

## 2024-11-09 PROCEDURE — 82947 ASSAY GLUCOSE BLOOD QUANT: CPT

## 2024-11-09 PROCEDURE — 93005 ELECTROCARDIOGRAM TRACING: CPT

## 2024-11-09 PROCEDURE — 80053 COMPREHEN METABOLIC PANEL: CPT

## 2024-11-09 PROCEDURE — 6360000004 HC RX CONTRAST MEDICATION

## 2024-11-09 PROCEDURE — 84484 ASSAY OF TROPONIN QUANT: CPT

## 2024-11-09 PROCEDURE — 82803 BLOOD GASES ANY COMBINATION: CPT

## 2024-11-09 PROCEDURE — 36415 COLL VENOUS BLD VENIPUNCTURE: CPT

## 2024-11-09 PROCEDURE — 84520 ASSAY OF UREA NITROGEN: CPT

## 2024-11-09 PROCEDURE — 85379 FIBRIN DEGRADATION QUANT: CPT

## 2024-11-09 PROCEDURE — 82330 ASSAY OF CALCIUM: CPT

## 2024-11-09 PROCEDURE — 99285 EMERGENCY DEPT VISIT HI MDM: CPT

## 2024-11-09 PROCEDURE — 80051 ELECTROLYTE PANEL: CPT

## 2024-11-09 PROCEDURE — 94640 AIRWAY INHALATION TREATMENT: CPT

## 2024-11-09 PROCEDURE — 82565 ASSAY OF CREATININE: CPT

## 2024-11-09 PROCEDURE — 71260 CT THORAX DX C+: CPT

## 2024-11-09 PROCEDURE — 85014 HEMATOCRIT: CPT

## 2024-11-09 PROCEDURE — 87040 BLOOD CULTURE FOR BACTERIA: CPT

## 2024-11-09 PROCEDURE — 83605 ASSAY OF LACTIC ACID: CPT

## 2024-11-09 PROCEDURE — 85025 COMPLETE CBC W/AUTO DIFF WBC: CPT

## 2024-11-09 PROCEDURE — 71045 X-RAY EXAM CHEST 1 VIEW: CPT

## 2024-11-09 PROCEDURE — 6370000000 HC RX 637 (ALT 250 FOR IP)

## 2024-11-09 RX ORDER — OXYCODONE HYDROCHLORIDE 10 MG/1
10 TABLET ORAL EVERY 4 HOURS PRN
COMMUNITY
Start: 2024-08-30

## 2024-11-09 RX ORDER — IOPAMIDOL 755 MG/ML
75 INJECTION, SOLUTION INTRAVASCULAR
Status: COMPLETED | OUTPATIENT
Start: 2024-11-09 | End: 2024-11-09

## 2024-11-09 RX ORDER — IPRATROPIUM BROMIDE AND ALBUTEROL SULFATE 2.5; .5 MG/3ML; MG/3ML
1 SOLUTION RESPIRATORY (INHALATION) EVERY 4 HOURS PRN
Status: DISCONTINUED | OUTPATIENT
Start: 2024-11-09 | End: 2024-11-14 | Stop reason: HOSPADM

## 2024-11-09 RX ORDER — METHOCARBAMOL 750 MG/1
750 TABLET, FILM COATED ORAL 3 TIMES DAILY
COMMUNITY

## 2024-11-09 RX ADMIN — IOPAMIDOL 75 ML: 755 INJECTION, SOLUTION INTRAVENOUS at 22:57

## 2024-11-09 RX ADMIN — IPRATROPIUM BROMIDE AND ALBUTEROL SULFATE 1 DOSE: 2.5; .5 SOLUTION RESPIRATORY (INHALATION) at 20:30

## 2024-11-10 PROBLEM — R06.03 RESPIRATORY DISTRESS: Status: ACTIVE | Noted: 2024-11-10

## 2024-11-10 PROBLEM — J96.01 ACUTE RESPIRATORY FAILURE WITH HYPOXIA: Status: ACTIVE | Noted: 2024-11-10

## 2024-11-10 LAB
ALBUMIN SERPL-MCNC: 4.2 G/DL (ref 3.5–5.2)
ALBUMIN/GLOB SERPL: 1.4 {RATIO} (ref 1–2.5)
ALP SERPL-CCNC: 96 U/L (ref 35–104)
ALT SERPL-CCNC: 39 U/L (ref 10–35)
ANION GAP SERPL CALCULATED.3IONS-SCNC: 13 MMOL/L (ref 9–16)
AST SERPL-CCNC: 31 U/L (ref 10–35)
BASOPHILS # BLD: <0.03 K/UL (ref 0–0.2)
BASOPHILS NFR BLD: 0 % (ref 0–2)
BILIRUB SERPL-MCNC: 0.2 MG/DL (ref 0–1.2)
BNP SERPL-MCNC: <36 PG/ML (ref 0–125)
BODY TEMPERATURE: 37
BUN SERPL-MCNC: 15 MG/DL (ref 6–20)
CALCIUM SERPL-MCNC: 9.3 MG/DL (ref 8.6–10.4)
CHLORIDE SERPL-SCNC: 104 MMOL/L (ref 98–107)
CO2 SERPL-SCNC: 21 MMOL/L (ref 20–31)
COHGB MFR BLD: 2.3 % (ref 0–5)
CREAT SERPL-MCNC: 0.7 MG/DL (ref 0.6–0.9)
EKG ATRIAL RATE: 97 BPM
EKG P AXIS: 45 DEGREES
EKG P-R INTERVAL: 194 MS
EKG Q-T INTERVAL: 374 MS
EKG QRS DURATION: 100 MS
EKG QTC CALCULATION (BAZETT): 474 MS
EKG R AXIS: 65 DEGREES
EKG T AXIS: 60 DEGREES
EKG VENTRICULAR RATE: 97 BPM
EOSINOPHIL # BLD: <0.03 K/UL (ref 0–0.44)
EOSINOPHILS RELATIVE PERCENT: 0 % (ref 1–4)
ERYTHROCYTE [DISTWIDTH] IN BLOOD BY AUTOMATED COUNT: 14.8 % (ref 11.8–14.4)
FIO2 ON VENT: ABNORMAL %
FLUAV AG SPEC QL: NEGATIVE
FLUBV AG SPEC QL: NEGATIVE
GFR, ESTIMATED: >90 ML/MIN/1.73M2
GLUCOSE SERPL-MCNC: 172 MG/DL (ref 74–99)
HCO3 VENOUS: 22.3 MMOL/L (ref 24–30)
HCT VFR BLD AUTO: 41 % (ref 36.3–47.1)
HGB BLD-MCNC: 12.8 G/DL (ref 11.9–15.1)
IMM GRANULOCYTES # BLD AUTO: 0.12 K/UL (ref 0–0.3)
IMM GRANULOCYTES NFR BLD: 1 %
LYMPHOCYTES NFR BLD: 1.24 K/UL (ref 1.1–3.7)
LYMPHOCYTES RELATIVE PERCENT: 10 % (ref 24–43)
MCH RBC QN AUTO: 28.8 PG (ref 25.2–33.5)
MCHC RBC AUTO-ENTMCNC: 31.2 G/DL (ref 28.4–34.8)
MCV RBC AUTO: 92.1 FL (ref 82.6–102.9)
MONOCYTES NFR BLD: 0.17 K/UL (ref 0.1–1.2)
MONOCYTES NFR BLD: 1 % (ref 3–12)
NEGATIVE BASE EXCESS, VEN: 2 MMOL/L (ref 0–2)
NEUTROPHILS NFR BLD: 88 % (ref 36–65)
NEUTS SEG NFR BLD: 11.43 K/UL (ref 1.5–8.1)
NRBC BLD-RTO: 0 PER 100 WBC
O2 SAT, VEN: 97.1 % (ref 60–85)
PCO2 VENOUS: 38.5 MM HG (ref 39–55)
PH VENOUS: 7.38 (ref 7.32–7.42)
PLATELET # BLD AUTO: 315 K/UL (ref 138–453)
PMV BLD AUTO: 10.5 FL (ref 8.1–13.5)
PO2 VENOUS: 93.9 MM HG (ref 30–50)
POTASSIUM SERPL-SCNC: 4.4 MMOL/L (ref 3.7–5.3)
PROCALCITONIN SERPL-MCNC: 0.15 NG/ML (ref 0–0.09)
PROT SERPL-MCNC: 7.1 G/DL (ref 6.6–8.7)
RBC # BLD AUTO: 4.45 M/UL (ref 3.95–5.11)
RBC # BLD: ABNORMAL 10*6/UL
SARS-COV-2 RDRP RESP QL NAA+PROBE: NOT DETECTED
SODIUM SERPL-SCNC: 138 MMOL/L (ref 136–145)
SPECIMEN DESCRIPTION: NORMAL
TROPONIN I SERPL HS-MCNC: 10 NG/L (ref 0–14)
TROPONIN I SERPL HS-MCNC: <6 NG/L (ref 0–14)
TROPONIN I SERPL HS-MCNC: <6 NG/L (ref 0–14)
WBC OTHER # BLD: 13 K/UL (ref 3.5–11.3)

## 2024-11-10 PROCEDURE — 6370000000 HC RX 637 (ALT 250 FOR IP): Performed by: NURSE PRACTITIONER

## 2024-11-10 PROCEDURE — 6360000002 HC RX W HCPCS: Performed by: NURSE PRACTITIONER

## 2024-11-10 PROCEDURE — 6370000000 HC RX 637 (ALT 250 FOR IP)

## 2024-11-10 PROCEDURE — 6370000000 HC RX 637 (ALT 250 FOR IP): Performed by: FAMILY MEDICINE

## 2024-11-10 PROCEDURE — 94640 AIRWAY INHALATION TREATMENT: CPT

## 2024-11-10 PROCEDURE — 84484 ASSAY OF TROPONIN QUANT: CPT

## 2024-11-10 PROCEDURE — 99223 1ST HOSP IP/OBS HIGH 75: CPT | Performed by: STUDENT IN AN ORGANIZED HEALTH CARE EDUCATION/TRAINING PROGRAM

## 2024-11-10 PROCEDURE — 36415 COLL VENOUS BLD VENIPUNCTURE: CPT

## 2024-11-10 PROCEDURE — 87635 SARS-COV-2 COVID-19 AMP PRB: CPT

## 2024-11-10 PROCEDURE — 2700000000 HC OXYGEN THERAPY PER DAY

## 2024-11-10 PROCEDURE — 2580000003 HC RX 258: Performed by: NURSE PRACTITIONER

## 2024-11-10 PROCEDURE — 94761 N-INVAS EAR/PLS OXIMETRY MLT: CPT

## 2024-11-10 PROCEDURE — 87804 INFLUENZA ASSAY W/OPTIC: CPT

## 2024-11-10 PROCEDURE — 87070 CULTURE OTHR SPECIMN AEROBIC: CPT

## 2024-11-10 PROCEDURE — 2060000000 HC ICU INTERMEDIATE R&B

## 2024-11-10 PROCEDURE — 6360000002 HC RX W HCPCS: Performed by: FAMILY MEDICINE

## 2024-11-10 PROCEDURE — 80053 COMPREHEN METABOLIC PANEL: CPT

## 2024-11-10 PROCEDURE — 85025 COMPLETE CBC W/AUTO DIFF WBC: CPT

## 2024-11-10 PROCEDURE — 5A09357 ASSISTANCE WITH RESPIRATORY VENTILATION, LESS THAN 24 CONSECUTIVE HOURS, CONTINUOUS POSITIVE AIRWAY PRESSURE: ICD-10-PCS

## 2024-11-10 PROCEDURE — 84145 PROCALCITONIN (PCT): CPT

## 2024-11-10 PROCEDURE — 93005 ELECTROCARDIOGRAM TRACING: CPT | Performed by: FAMILY MEDICINE

## 2024-11-10 PROCEDURE — 83880 ASSAY OF NATRIURETIC PEPTIDE: CPT

## 2024-11-10 PROCEDURE — 87205 SMEAR GRAM STAIN: CPT

## 2024-11-10 PROCEDURE — 82805 BLOOD GASES W/O2 SATURATION: CPT

## 2024-11-10 PROCEDURE — 93010 ELECTROCARDIOGRAM REPORT: CPT | Performed by: INTERNAL MEDICINE

## 2024-11-10 RX ORDER — ACETAMINOPHEN 325 MG/1
650 TABLET ORAL EVERY 6 HOURS PRN
Status: DISCONTINUED | OUTPATIENT
Start: 2024-11-10 | End: 2024-11-14 | Stop reason: HOSPADM

## 2024-11-10 RX ORDER — PREDNISONE 20 MG/1
40 TABLET ORAL DAILY
Status: DISCONTINUED | OUTPATIENT
Start: 2024-11-12 | End: 2024-11-11

## 2024-11-10 RX ORDER — ONDANSETRON 4 MG/1
4 TABLET, ORALLY DISINTEGRATING ORAL EVERY 8 HOURS PRN
Status: DISCONTINUED | OUTPATIENT
Start: 2024-11-10 | End: 2024-11-14 | Stop reason: HOSPADM

## 2024-11-10 RX ORDER — ALBUTEROL SULFATE 0.83 MG/ML
2.5 SOLUTION RESPIRATORY (INHALATION)
Status: DISCONTINUED | OUTPATIENT
Start: 2024-11-10 | End: 2024-11-10

## 2024-11-10 RX ORDER — IPRATROPIUM BROMIDE AND ALBUTEROL SULFATE 2.5; .5 MG/3ML; MG/3ML
1 SOLUTION RESPIRATORY (INHALATION)
Status: DISCONTINUED | OUTPATIENT
Start: 2024-11-10 | End: 2024-11-14 | Stop reason: HOSPADM

## 2024-11-10 RX ORDER — ACETAMINOPHEN 650 MG/1
650 SUPPOSITORY RECTAL EVERY 6 HOURS PRN
Status: DISCONTINUED | OUTPATIENT
Start: 2024-11-10 | End: 2024-11-14 | Stop reason: HOSPADM

## 2024-11-10 RX ORDER — SODIUM CHLORIDE 0.9 % (FLUSH) 0.9 %
5-40 SYRINGE (ML) INJECTION PRN
Status: DISCONTINUED | OUTPATIENT
Start: 2024-11-10 | End: 2024-11-14 | Stop reason: HOSPADM

## 2024-11-10 RX ORDER — LABETALOL HYDROCHLORIDE 5 MG/ML
10 INJECTION, SOLUTION INTRAVENOUS 3 TIMES DAILY PRN
Status: DISCONTINUED | OUTPATIENT
Start: 2024-11-10 | End: 2024-11-14 | Stop reason: HOSPADM

## 2024-11-10 RX ORDER — DIPHENHYDRAMINE HYDROCHLORIDE 50 MG/ML
25 INJECTION INTRAMUSCULAR; INTRAVENOUS ONCE
Status: COMPLETED | OUTPATIENT
Start: 2024-11-10 | End: 2024-11-11

## 2024-11-10 RX ORDER — ENOXAPARIN SODIUM 100 MG/ML
30 INJECTION SUBCUTANEOUS 2 TIMES DAILY
Status: DISCONTINUED | OUTPATIENT
Start: 2024-11-10 | End: 2024-11-14 | Stop reason: HOSPADM

## 2024-11-10 RX ORDER — IPRATROPIUM BROMIDE AND ALBUTEROL SULFATE 2.5; .5 MG/3ML; MG/3ML
1 SOLUTION RESPIRATORY (INHALATION)
Status: DISCONTINUED | OUTPATIENT
Start: 2024-11-10 | End: 2024-11-10

## 2024-11-10 RX ORDER — SODIUM CHLORIDE 9 MG/ML
INJECTION, SOLUTION INTRAVENOUS PRN
Status: DISCONTINUED | OUTPATIENT
Start: 2024-11-10 | End: 2024-11-14 | Stop reason: HOSPADM

## 2024-11-10 RX ORDER — SODIUM CHLORIDE 0.9 % (FLUSH) 0.9 %
5-40 SYRINGE (ML) INJECTION EVERY 12 HOURS SCHEDULED
Status: DISCONTINUED | OUTPATIENT
Start: 2024-11-10 | End: 2024-11-14 | Stop reason: HOSPADM

## 2024-11-10 RX ORDER — POLYETHYLENE GLYCOL 3350 17 G/17G
17 POWDER, FOR SOLUTION ORAL DAILY PRN
Status: DISCONTINUED | OUTPATIENT
Start: 2024-11-10 | End: 2024-11-14 | Stop reason: HOSPADM

## 2024-11-10 RX ORDER — OXYCODONE HYDROCHLORIDE 5 MG/1
10 TABLET ORAL EVERY 4 HOURS PRN
Status: DISCONTINUED | OUTPATIENT
Start: 2024-11-10 | End: 2024-11-14 | Stop reason: HOSPADM

## 2024-11-10 RX ORDER — SODIUM CHLORIDE 9 MG/ML
INJECTION, SOLUTION INTRAVENOUS CONTINUOUS
Status: DISCONTINUED | OUTPATIENT
Start: 2024-11-10 | End: 2024-11-10

## 2024-11-10 RX ORDER — METHOCARBAMOL 750 MG/1
750 TABLET, FILM COATED ORAL EVERY 8 HOURS PRN
Status: DISCONTINUED | OUTPATIENT
Start: 2024-11-10 | End: 2024-11-14 | Stop reason: HOSPADM

## 2024-11-10 RX ORDER — ONDANSETRON 2 MG/ML
4 INJECTION INTRAMUSCULAR; INTRAVENOUS EVERY 6 HOURS PRN
Status: DISCONTINUED | OUTPATIENT
Start: 2024-11-10 | End: 2024-11-14 | Stop reason: HOSPADM

## 2024-11-10 RX ADMIN — SODIUM CHLORIDE, PRESERVATIVE FREE 10 ML: 5 INJECTION INTRAVENOUS at 11:33

## 2024-11-10 RX ADMIN — IPRATROPIUM BROMIDE AND ALBUTEROL SULFATE 1 DOSE: .5; 2.5 SOLUTION RESPIRATORY (INHALATION) at 08:08

## 2024-11-10 RX ADMIN — SODIUM CHLORIDE: 9 INJECTION, SOLUTION INTRAVENOUS at 02:40

## 2024-11-10 RX ADMIN — METHOCARBAMOL 750 MG: 750 TABLET ORAL at 02:53

## 2024-11-10 RX ADMIN — OXYCODONE 10 MG: 5 TABLET ORAL at 11:33

## 2024-11-10 RX ADMIN — OXYCODONE 10 MG: 5 TABLET ORAL at 20:02

## 2024-11-10 RX ADMIN — LABETALOL HYDROCHLORIDE 10 MG: 5 INJECTION, SOLUTION INTRAVENOUS at 20:03

## 2024-11-10 RX ADMIN — METHYLPREDNISOLONE SODIUM SUCCINATE 40 MG: 40 INJECTION INTRAMUSCULAR; INTRAVENOUS at 09:40

## 2024-11-10 RX ADMIN — METHYLPREDNISOLONE SODIUM SUCCINATE 40 MG: 40 INJECTION INTRAMUSCULAR; INTRAVENOUS at 03:03

## 2024-11-10 RX ADMIN — OXYCODONE 10 MG: 5 TABLET ORAL at 02:53

## 2024-11-10 RX ADMIN — OXYCODONE 10 MG: 5 TABLET ORAL at 06:53

## 2024-11-10 RX ADMIN — OXYCODONE 10 MG: 5 TABLET ORAL at 15:55

## 2024-11-10 RX ADMIN — SODIUM CHLORIDE, PRESERVATIVE FREE 10 ML: 5 INJECTION INTRAVENOUS at 09:42

## 2024-11-10 RX ADMIN — METHOCARBAMOL 750 MG: 750 TABLET ORAL at 14:02

## 2024-11-10 RX ADMIN — ACETAMINOPHEN 650 MG: 325 TABLET ORAL at 05:21

## 2024-11-10 RX ADMIN — SODIUM CHLORIDE, PRESERVATIVE FREE 10 ML: 5 INJECTION INTRAVENOUS at 20:05

## 2024-11-10 RX ADMIN — IPRATROPIUM BROMIDE AND ALBUTEROL SULFATE 1 DOSE: 2.5; .5 SOLUTION RESPIRATORY (INHALATION) at 16:02

## 2024-11-10 RX ADMIN — IPRATROPIUM BROMIDE AND ALBUTEROL SULFATE 1 DOSE: .5; 2.5 SOLUTION RESPIRATORY (INHALATION) at 19:56

## 2024-11-10 RX ADMIN — METHYLPREDNISOLONE SODIUM SUCCINATE 40 MG: 40 INJECTION INTRAMUSCULAR; INTRAVENOUS at 14:00

## 2024-11-10 RX ADMIN — METHYLPREDNISOLONE SODIUM SUCCINATE 40 MG: 40 INJECTION INTRAMUSCULAR; INTRAVENOUS at 20:03

## 2024-11-10 ASSESSMENT — PAIN - FUNCTIONAL ASSESSMENT
PAIN_FUNCTIONAL_ASSESSMENT: ACTIVITIES ARE NOT PREVENTED
PAIN_FUNCTIONAL_ASSESSMENT: ACTIVITIES ARE NOT PREVENTED

## 2024-11-10 ASSESSMENT — PAIN DESCRIPTION - DESCRIPTORS
DESCRIPTORS: ACHING
DESCRIPTORS: SHARP;SHOOTING

## 2024-11-10 ASSESSMENT — PAIN SCALES - GENERAL
PAINLEVEL_OUTOF10: 4
PAINLEVEL_OUTOF10: 8
PAINLEVEL_OUTOF10: 4
PAINLEVEL_OUTOF10: 4
PAINLEVEL_OUTOF10: 7
PAINLEVEL_OUTOF10: 8
PAINLEVEL_OUTOF10: 4
PAINLEVEL_OUTOF10: 8
PAINLEVEL_OUTOF10: 7
PAINLEVEL_OUTOF10: 7
PAINLEVEL_OUTOF10: 8

## 2024-11-10 ASSESSMENT — PAIN DESCRIPTION - LOCATION
LOCATION: BACK

## 2024-11-10 ASSESSMENT — PAIN DESCRIPTION - ORIENTATION: ORIENTATION: MID

## 2024-11-10 NOTE — PROGRESS NOTES
Patient called out due to right side chest discomfort; patient stated that she attempted to fall asleep when she noticed that her pulse ox dropped to 88%; O2 applied via NC; messaged provider to obtain EKG orders; EKG completed; NSR; Patient reports that she feels much better since O2 was applied; patient denies current chest discomfort or concerns.  Nadya advised patient that while she is asleep it is best to keep O2 on.  Patient verbalized understanding.

## 2024-11-10 NOTE — ED PROVIDER NOTES
[facility] ED  Emergency Department Encounter  Emergency Medicine Resident     Pt Name:Dannielle Bustillo  MRN: [id number]  Birthdate 1970  Date of evaluation: 11/9/24  PCP:  Gil Black MD  Note Started: 8:21 PM EST      CHIEF COMPLAINT       Chief Complaint   Patient presents with    Respiratory Distress     Pt arrives per EMS with c/o difficulty breathing. Per EMS, oxygen saturation @ 82% on RA, speaking in 2-3 word phrases. Pt placed on C-PAP per EMS, SpO2-99%. Pt given 125mg solumedrol IVP and 2Gm Mg en route to ED. Pt reports history of pneumonia, just finished po antibiotics yesterday.        HISTORY OF PRESENT ILLNESS  (Location/Symptom, Timing/Onset, Context/Setting, Quality, Duration, Modifying Factors, Severity.)      Dannielle Bustillo is a 54 y.o. female with PMH of DDD, HTN, pneumonia who presents with shortness of breath.  Per EMS run sheet, patient had 2-3 word sentences and was significantly short of breath.  Patient arrives to ED on BiPAP.  Patient currently on BiPAP, stating she finished her antibiotic regimen for pneumonia couple weeks ago.  Patient noted to be doing well on BiPAP, not in significant distress with 8 of BiPAP.  Denies chest pain.  No bilateral lower extremity edema.  Noted to have significant rhonchi to bilateral upper lobes.  Per chart check, patient was treated for bronchitis on 10/28/2024.  Patient states she did have a 30-year history of smoking 1.5 packs/day.  Patient states she stopped smoking 2 months ago.    PAST MEDICAL / SURGICAL / SOCIAL / FAMILY HISTORY      has a past medical history of DDD (degenerative disc disease), cervical, DDD (degenerative disc disease), lumbar, Hypertension, Loss of perception for taste, Nasal congestion, Nasal polyps, and Spinal cord stimulator status.       has a past surgical history that includes Cholecystectomy; back surgery; Tubal ligation; Tonsillectomy and adenoidectomy; [place] tooth extraction; Nasal polyp  alert and oriented to person, place, and time.   Psychiatric:         Mood and Affect: Mood normal.           DDX/DIAGNOSTIC RESULTS / EMERGENCY DEPARTMENT COURSE / MDM     Medical Decision Making  54 y.o. female with PMH of DDD, HTN, pneumonia who presents with shortness of breath.  Per EMS run sheet, patient had 2-3 word sentences and was significantly short of breath.  Patient arrives to ED on BiPAP.  Patient currently on BiPAP, stating she finished her antibiotic regimen for pneumonia couple weeks ago.  Patient noted to be doing well on BiPAP, not in significant distress with 8 of BiPAP.  Denies chest pain.  No bilateral lower extremity edema.  Noted to have significant rhonchi to bilateral upper lobes.  Per chart check, patient was treated for bronchitis on 10/28/2024.    Given patient being, HPI, physical exam, differential diagnosis includes ACS, pneumonia, pneumothorax, PE, aortic dissection, and anaphylaxis/allergic reaction.  Will workup with cardiac workup including CBC, BMP, troponin, D-dimer, EKG, chest x-ray, and if need be scan for PE.  Patient on BiPAP per RT, will treat with nebs as needed.    Amount and/or Complexity of Data Reviewed  Labs: ordered. Decision-making details documented in ED Course.  Radiology: ordered. Decision-making details documented in ED Course.  ECG/medicine tests: ordered.    Risk  Prescription drug management.        EKG    Sinus tachycardia with ventricular rate of 116, , QRS 80, QTc 556, normal axis.    All EKG's are interpreted by the Emergency Department Physician who either signs or Co-signs this chart in the absence of a cardiologist.    EMERGENCY DEPARTMENT COURSE:      ED Course as of 11/10/24 0103   Sat Nov 09, 2024 2033 Patient states breathing effort has improved slightly.  Still on BiPAP.  Heart rate 113.  Discussed broadening workup with troponin, EKG, D-dimer. [SP]   2141 Troponin, High Sensitivity: <6 [SP]   2141 WBC(!): 14.9 [SP]   2141 D-Dimer,

## 2024-11-10 NOTE — PLAN OF CARE
Problem: Discharge Planning  Goal: Discharge to home or other facility with appropriate resources  11/10/2024 1048 by Sandrita Christiansen RN  Outcome: Progressing  11/10/2024 0535 by Génesis Saavedra RN  Outcome: Progressing     Problem: Safety - Adult  Goal: Free from fall injury  11/10/2024 1048 by Sandrita Christiansen RN  Outcome: Progressing  11/10/2024 0535 by Génesis Saavedra RN  Outcome: Progressing     Problem: Respiratory - Adult  Goal: Achieves optimal ventilation and oxygenation  11/10/2024 1048 by Sandrita Christiansen RN  Outcome: Progressing  Flowsheets (Taken 11/10/2024 0808 by Christina Herman RCP)  Achieves optimal ventilation and oxygenation:   Assess for changes in respiratory status   Respiratory therapy support as indicated  11/10/2024 0535 by Génesis Saavedra RN  Outcome: Progressing     Problem: Pain  Goal: Verbalizes/displays adequate comfort level or baseline comfort level  Outcome: Progressing     Problem: Skin/Tissue Integrity  Goal: Absence of new skin breakdown  Description: 1.  Monitor for areas of redness and/or skin breakdown  2.  Assess vascular access sites hourly  3.  Every 4-6 hours minimum:  Change oxygen saturation probe site  4.  Every 4-6 hours:  If on nasal continuous positive airway pressure, respiratory therapy assess nares and determine need for appliance change or resting period.  Outcome: Progressing

## 2024-11-10 NOTE — H&P
route to ED. Pt reports history of pneumonia, just finished po antibiotics yesterday. )   and is admitted to the hospital for the management of Respiratory distress.    54-year-old female with past medical history of chronic smoker 30-pack-year smoking history 1.5 packs/day(quit 2 months ago), degenerative disc disease, hypertension presented to ED in respiratory distress.  Patient complained of significant difficulty breathing before coming to the hospital.  Per EMS patient was saturating 82% on room air and was having difficulty speaking full sentences.  Patient was placed on CPAP and was given Solu-Medrol 125 mg IV x 1 and magnesium.  Patient mentioned that she was recently placed on antibiotic for pneumonia which she finished yesterday.    Of note patient was treated for bronchitis on 10/28/2024 with steroids and antibiotics.  Patient has not formally been diagnosed with COPD.    Patient did mention that she has been having dyspnea on exertion with climbing up stairs, taking groceries for quite some time.  She quit smoking 2 months ago and her symptoms exacerbated after that.  Patient denies any chest pain, nausea, vomiting, dizziness, lightheadedness.    ED patient was able to come off of BiPAP placed on 2 L of oxygen, she was desatting on room air.  Patient continued to remain tachycardic and dyspnea on exertion.    Vitals afebrile, respiration in 20s, pulse 108, blood pressure 158/85 saturating 94% on 2 L nasal cannula.  Lab workup BMP unremarkable, lactic acid 1.7, troponin negative x 1, LFTs mild elevation of AST ALT likely due to hepatic steatosis.  CBC showed mild leukocytosis WBC of 14.9.  COVID and flu test came back negative.  VBG unremarkable.  CTA chest negative for PE.    Past Medical History:     Past Medical History:   Diagnosis Date    DDD (degenerative disc disease), cervical     DDD (degenerative disc disease), lumbar     Hypertension     Resolved, no meds currently    Loss of perception for  taste     loss of smell    Nasal congestion     Nasal polyps     Spinal cord stimulator status     implanted        Past Surgical History:     Past Surgical History:   Procedure Laterality Date    BACK SURGERY      lumbar x 2, hardware, cages, fusion    CHOLECYSTECTOMY      NASAL POLYP SURGERY      had at least 4 nasal surgeries to remove polyps    SINUS ENDOSCOPY N/A 5/29/2024    SINUS ENDOSCOPY FUNCTIONAL SURGERY- WITH NAVIGATION- ETHMOIDECTOMY, MAXILLARY ANTROSTOMY, FRONTAL SINUS EXPLORATION, SPHENOIDOTOMY, POLPYTECTOMY performed by Monet Mcpherson MD at [facility] OR    TONSILLECTOMY AND ADENOIDECTOMY      TUBAL LIGATION      WISDOM TOOTH EXTRACTION          Medications Prior to Admission:     Prior to Admission medications    Medication Sig Start Date End Date Taking? Authorizing Provider   methocarbamol (ROBAXIN) 750 MG tablet Take 1 tablet by mouth 3 times daily   Yes Provider, MD Cedrick   oxyCODONE HCl (OXY-IR) 10 MG immediate release tablet Take 1 tablet by mouth every 4 hours as needed for Pain. 8/30/24  Yes ProviderCedrick MD   bisacodyl (DULCOLAX) 5 MG EC tablet Take 4 tablets at 10:00 am day before procedure  Patient not taking: Reported on 11/10/2024 10/14/24   Kate Nazario APRN - CNP   polyethylene glycol (MIRALAX) 17 GM/SCOOP powder Mix 238 grams of Miralax with 64 ounces Gatorade (no red or purple) start drinking at 6:00 pm finish by 8:00 pm all of the prep  Patient not taking: Reported on 11/10/2024 10/14/24   Kate Nazario APRN - CNP   ondansetron (ZOFRAN) 4 MG tablet Take 1 tablet by mouth every 6 hours as needed for Nausea or Vomiting 10/14/24   Kate Nazario APRN - CNP        Allergies:     Patient has no known allergies.    Social History:     Tobacco:    reports that she has quit smoking. Her smoking use included cigarettes. She has a 15 pack-year smoking history. She has never used smokeless tobacco.  Alcohol:      reports current alcohol use.  Drug Use:  reports no history of

## 2024-11-10 NOTE — ED PROVIDER NOTES
Faculty Sign-Out Attestation  Handoff taken on the following patient from prior Attending Physician: Yesika  Note Started: 11:03 PM EST    I was available and discussed any additional care issues that arose and coordinated the management plans with the resident(s) caring for the patient during my duty period. Any areas of disagreement with resident’s documentation of care or procedures are noted on the chart. I was personally present for the key portions of any/all procedures during my duty period. I have documented in the chart those procedures where I was not present during the key portions.    Respiratory distress, off bipap,   Ct r/o pe pending,   needing admit    Ct no pe, admitted    Vitor Nelson DO  Attending Physician       Vitor Nelson,   11/09/24 8040       Vitor Nelson DO  11/10/24 0016       Vitor Nelson,   11/10/24 0334

## 2024-11-10 NOTE — ED NOTES
ED to inpatient nurses report      Chief Complaint:  Chief Complaint   Patient presents with    Respiratory Distress     Pt arrives per EMS with c/o difficulty breathing. Per EMS, oxygen saturation @ 82% on RA, speaking in 2-3 word phrases. Pt placed on C-PAP per EMS, SpO2-99%. Pt given 125mg solumedrol IVP and 2Gm Mg en route to ED. Pt reports history of pneumonia, just finished po antibiotics yesterday.      Present to ED from: Home    MOA:     LOC: alert and orientated to name, place, date  Mobility: Independent  Oxygen Baseline: RA    Current needs required: 2L   Pending ED orders: none  Present condition: stable    Why did the patient come to the ED? Shortness of breath  What is the plan?  Any procedures or intervention occur? Labs, meds, cxr  Any safety concerns??    Mental Status:       Psych Assessment:   Psychosocial  Psychosocial (WDL): Within Defined Limits  Vital signs   Vitals:    11/09/24 2306 11/10/24 0000 11/10/24 0023 11/10/24 0100   BP: (!) 174/82 (!) 172/86 (!) 162/95 (!) 158/85   Pulse: (!) 106 (!) 104 (!) 111 (!) 108   Resp: 12 19 22 16   Temp:       TempSrc:       SpO2: 90% 93% 95% 94%   Weight:       Height:            Vitals:  Patient Vitals for the past 24 hrs:   BP Temp Temp src Pulse Resp SpO2 Height Weight   11/10/24 0100 (!) 158/85 -- -- (!) 108 16 94 % -- --   11/10/24 0023 (!) 162/95 -- -- (!) 111 22 95 % -- --   11/10/24 0000 (!) 172/86 -- -- (!) 104 19 93 % -- --   11/09/24 2306 (!) 174/82 -- -- (!) 106 12 90 % -- --   11/09/24 2230 (!) 172/96 -- -- (!) 118 17 98 % -- --   11/09/24 2152 -- -- -- (!) 111 19 94 % -- --   11/09/24 2130 136/84 -- -- (!) 104 19 99 % -- --   11/09/24 2045 (!) 155/84 -- -- (!) 117 20 99 % -- --   11/09/24 2030 134/69 -- -- (!) 112 21 99 % -- --   11/09/24 2013 -- -- -- (!) 124 -- -- -- --   11/09/24 1951 (!) 165/119 -- -- (!) 125 -- 96 % -- --   11/09/24 1949 (!) 165/119 98.5 °F (36.9 °C) Oral (!) 128 21 95 % 1.676 m (5' 6\") 111.1 kg (245 lb)      Visit  requirement, tachycardia, dyspnea on exertion, and treatment for 2 separate respiratory infections in the last month, will be consulting with internal medicine team for admission.  Patient may likely benefit from medical management in regards to undiagnosed COPD, cardiac etiology as patient has not had a recent echo or stress test. [SP]   0057 Spoke to hospitalist team in regards to patient presentation, workup, treatment and continued symptoms of tachycardia, shortness of breath, dyspnea on exertion, and subtle chest pain. [SP]      ED Course User Index  [SP] Patel Daley MD          Skin Assessment:        Pain Score:         SOCIAL HISTORY       Social History     Socioeconomic History    Marital status:      Spouse name: None    Number of children: None    Years of education: None    Highest education level: None   Tobacco Use    Smoking status: Every Day     Current packs/day: 0.50     Average packs/day: 0.5 packs/day for 30.0 years (15.0 ttl pk-yrs)     Types: Cigarettes    Smokeless tobacco: Never   Vaping Use    Vaping status: Some Days   Substance and Sexual Activity    Alcohol use: Yes     Comment: very rare    Drug use: Never     Social Determinants of Health     Food Insecurity: No Food Insecurity (10/15/2024)    Received from [facility]    Hunger Screening     Within the past 12 months we worried whether our food would run out before we got money to buy more.: Never True     Within the past 12 months the food we bought just didn't last and we didn't have money to get more.: Never True       FAMILY HISTORY     History reviewed. No pertinent family history.    ALLERGIES     Patient has no known allergies.    CURRENT MEDICATIONS       Previous Medications    BISACODYL (DULCOLAX) 5 MG EC TABLET    Take 4 tablets at 10:00 am day before procedure    METHOCARBAMOL (ROBAXIN) 750 MG TABLET    Take 1 tablet by mouth 3 times daily    ONDANSETRON (ZOFRAN) 4 MG TABLET    Take 1 tablet by mouth

## 2024-11-10 NOTE — ED NOTES
SpO2 drops between 88-90% on RA. Pt placed on 2lpm via NC, SpO2-95%. No further needs expressed at this time. Pts daughter and  at bedside. Call light within reach.

## 2024-11-10 NOTE — ED PROVIDER NOTES
[facility] ED     Emergency Department     Faculty Attestation        I performed a history and physical examination of the patient and discussed management with the resident. I reviewed the resident’s note and agree with the documented findings and plan of care. Any areas of disagreement are noted on the chart. I was personally present for the key portions of any procedures. I have documented in the chart those procedures where I was not present during the key portions. I have reviewed the emergency nurses triage note. I agree with the chief complaint, past medical history, past surgical history, allergies, medications, social and family history as documented unless otherwise noted below.    For mid-level providers such as nurse practitioners as well as physicians assistants:    I have personally seen and evaluated the patient.    I find the patient's history and physical exam are consistent with NP/PA documentation.  I agree with the care provided, treatment rendered, disposition, & follow-up plan.     Additional findings are as noted.    Vital Signs: BP (!) 155/84   Pulse (!) 117   Temp 98.5 °F (36.9 °C) (Oral)   Resp 20   Ht 1.676 m (5' 6\")   Wt 111.1 kg (245 lb)   SpO2 99%   BMI 39.54 kg/m²   PCP:  Gil Black MD    Pertinent Comments:     Patient presents to the emergency department for evaluation of shortness of breath she had a cough and shortness of breath and was treated for pneumonia she completed antibiotics but became acutely worse couple days ago.  She was history of breathing treatments are by EMS.  She is currently on BiPAP and significantly improved.  Due to duration systems getting cough shortness of breath and hypoxic obtain full cardiac workup septic labs, D-dimer chest x-ray, anticipate admission.      Critical Care  None          Russ Napoles MD    Attending Emergency Medicine Physician            Sebastián Napoels,

## 2024-11-10 NOTE — ED NOTES
Pt ambulatory to bathroom with steady gait. Pt with dyspnea upon returning to room. Dr. Daley at bedside to update pt and family on POC. Call light within reach.

## 2024-11-10 NOTE — CARE COORDINATION
Case Management Assessment  Initial Evaluation    Date/Time of Evaluation: 11/10/2024 9:40 AM  Assessment Completed by: Sharri Mendoza RN    If patient is discharged prior to next notation, then this note serves as note for discharge by case management.    Patient Name: Dannielle Bustillo                   YOB: 1970  Diagnosis: Shortness of breath [R06.02]  Respiratory distress [R06.03]  Dyspnea on exertion [R06.09]  Tachycardia [R00.0]                   Date / Time: 11/9/2024  7:47 PM    Patient Admission Status: Inpatient   Readmission Risk (Low < 19, Mod (19-27), High > 27): Readmission Risk Score: 6.7    Current PCP: Gil Black MD  PCP verified by CM? (P) Yes    Chart Reviewed: Yes      History Provided by: (P) Patient  Patient Orientation: (P) Alert and Oriented, Person, Place, Situation, Self    Patient Cognition: (P) Alert    Hospitalization in the last 30 days (Readmission):  No    If yes, Readmission Assessment in  Navigator will be completed.    Advance Directives:      Code Status: Full Code   Patient's Primary Decision Maker is: (P) Legal Next of Kin      Discharge Planning:    Patient lives with: (P) Spouse/Significant Other Type of Home: (P) House  Primary Care Giver: (P) Self  Patient Support Systems include: (P) Children, Family Members, Spouse/Significant Other   Current Financial resources: (P) Other (Comment) ([employer] [place])  Current community resources: (P) None  Current services prior to admission: (P) Durable Medical Equipment            Current DME: (P) Home Aerosol            Type of Home Care services:  (P) None    ADLS  Prior functional level: (P) Independent in ADLs/IADLs  Current functional level: (P) Independent in ADLs/IADLs    PT AM-PAC:   /24  OT AM-PAC:   /24    Family can provide assistance at DC: (P) Yes  Would you like Case Management to discuss the discharge plan with any other family members/significant others, and if so, who? (P) No  Plans to

## 2024-11-11 ENCOUNTER — APPOINTMENT (OUTPATIENT)
Dept: NUCLEAR MEDICINE | Age: 54
DRG: 189 | End: 2024-11-11
Payer: COMMERCIAL

## 2024-11-11 ENCOUNTER — HOSPITAL ENCOUNTER (INPATIENT)
Age: 54
Discharge: HOME | DRG: 189 | End: 2024-11-13
Payer: COMMERCIAL

## 2024-11-11 ENCOUNTER — APPOINTMENT (OUTPATIENT)
Dept: GENERAL RADIOLOGY | Age: 54
DRG: 189 | End: 2024-11-11
Payer: COMMERCIAL

## 2024-11-11 VITALS — RESPIRATION RATE: 20 BRPM | DIASTOLIC BLOOD PRESSURE: 84 MMHG | HEART RATE: 93 BPM | SYSTOLIC BLOOD PRESSURE: 132 MMHG

## 2024-11-11 PROBLEM — G47.30 SLEEP-RELATED BREATHING DISORDER: Status: ACTIVE | Noted: 2024-11-11

## 2024-11-11 PROBLEM — E66.9 OBESITY (BMI 35.0-39.9 WITHOUT COMORBIDITY): Status: ACTIVE | Noted: 2024-11-11

## 2024-11-11 PROBLEM — F17.201 TOBACCO USE DISORDER, MODERATE, IN EARLY REMISSION: Status: ACTIVE | Noted: 2024-11-11

## 2024-11-11 PROBLEM — R06.09 DYSPNEA ON EXERTION: Status: ACTIVE | Noted: 2024-11-11

## 2024-11-11 PROBLEM — J45.909 TRIAD ASTHMA: Status: ACTIVE | Noted: 2024-11-11

## 2024-11-11 PROBLEM — Z88.6 TRIAD ASTHMA: Status: ACTIVE | Noted: 2024-11-11

## 2024-11-11 PROBLEM — J33.9 NASAL POLYPOSIS: Status: ACTIVE | Noted: 2024-11-11

## 2024-11-11 PROBLEM — J33.9 TRIAD ASTHMA: Status: ACTIVE | Noted: 2024-11-11

## 2024-11-11 LAB
B-HCG SERPL EIA 3RD IS-ACNC: <0.2 MIU/ML
BASOPHILS # BLD: <0.03 K/UL (ref 0–0.2)
BASOPHILS NFR BLD: 0 % (ref 0–2)
ECHO BSA: 2.27 M2
EKG ATRIAL RATE: 116 BPM
EKG P-R INTERVAL: 186 MS
EKG Q-T INTERVAL: 400 MS
EKG QRS DURATION: 180 MS
EKG QTC CALCULATION (BAZETT): 556 MS
EKG R AXIS: 86 DEGREES
EKG T AXIS: -70 DEGREES
EKG VENTRICULAR RATE: 116 BPM
EOSINOPHIL # BLD: <0.03 K/UL (ref 0–0.44)
EOSINOPHILS RELATIVE PERCENT: 0 % (ref 1–4)
ERYTHROCYTE [DISTWIDTH] IN BLOOD BY AUTOMATED COUNT: 15.2 % (ref 11.8–14.4)
GLUCOSE BLD-MCNC: 236 MG/DL (ref 65–105)
HCT VFR BLD AUTO: 40.3 % (ref 36.3–47.1)
HGB BLD-MCNC: 12.4 G/DL (ref 11.9–15.1)
IMM GRANULOCYTES # BLD AUTO: 0.17 K/UL (ref 0–0.3)
IMM GRANULOCYTES NFR BLD: 1 %
INR PPP: 1
LYMPHOCYTES NFR BLD: 1.11 K/UL (ref 1.1–3.7)
LYMPHOCYTES RELATIVE PERCENT: 6 % (ref 24–43)
MCH RBC QN AUTO: 29 PG (ref 25.2–33.5)
MCHC RBC AUTO-ENTMCNC: 30.8 G/DL (ref 28.4–34.8)
MCV RBC AUTO: 94.4 FL (ref 82.6–102.9)
MONOCYTES NFR BLD: 0.6 K/UL (ref 0.1–1.2)
MONOCYTES NFR BLD: 3 % (ref 3–12)
NEUTROPHILS NFR BLD: 90 % (ref 36–65)
NEUTS SEG NFR BLD: 16.59 K/UL (ref 1.5–8.1)
NRBC BLD-RTO: 0 PER 100 WBC
NUC STRESS EJECTION FRACTION: 70 %
PLATELET # BLD AUTO: 325 K/UL (ref 138–453)
PMV BLD AUTO: 11 FL (ref 8.1–13.5)
PROTHROMBIN TIME: 13 SEC (ref 11.7–14.9)
RBC # BLD AUTO: 4.27 M/UL (ref 3.95–5.11)
RBC # BLD: ABNORMAL 10*6/UL
STRESS BASELINE DIAS BP: 84 MMHG
STRESS BASELINE HR: 94 BPM
STRESS BASELINE SYS BP: 132 MMHG
STRESS ESTIMATED WORKLOAD: 1 METS
STRESS PEAK DIAS BP: 82 MMHG
STRESS PEAK SYS BP: 169 MMHG
STRESS PERCENT HR ACHIEVED: 71 %
STRESS POST PEAK HR: 120 BPM
STRESS RATE PRESSURE PRODUCT: NORMAL BPM*MMHG
STRESS TARGET HR: 168 BPM
TID: 1.05
WBC OTHER # BLD: 18.5 K/UL (ref 3.5–11.3)

## 2024-11-11 PROCEDURE — 6370000000 HC RX 637 (ALT 250 FOR IP): Performed by: FAMILY MEDICINE

## 2024-11-11 PROCEDURE — 94761 N-INVAS EAR/PLS OXIMETRY MLT: CPT

## 2024-11-11 PROCEDURE — 3430000000 HC RX DIAGNOSTIC RADIOPHARMACEUTICAL: Performed by: FAMILY MEDICINE

## 2024-11-11 PROCEDURE — 94640 AIRWAY INHALATION TREATMENT: CPT

## 2024-11-11 PROCEDURE — 86225 DNA ANTIBODY NATIVE: CPT

## 2024-11-11 PROCEDURE — 85025 COMPLETE CBC W/AUTO DIFF WBC: CPT

## 2024-11-11 PROCEDURE — 6370000000 HC RX 637 (ALT 250 FOR IP): Performed by: INTERNAL MEDICINE

## 2024-11-11 PROCEDURE — 86003 ALLG SPEC IGE CRUDE XTRC EA: CPT

## 2024-11-11 PROCEDURE — 99232 SBSQ HOSP IP/OBS MODERATE 35: CPT | Performed by: FAMILY MEDICINE

## 2024-11-11 PROCEDURE — 2580000003 HC RX 258: Performed by: FAMILY MEDICINE

## 2024-11-11 PROCEDURE — 84702 CHORIONIC GONADOTROPIN TEST: CPT

## 2024-11-11 PROCEDURE — 71045 X-RAY EXAM CHEST 1 VIEW: CPT

## 2024-11-11 PROCEDURE — 6370000000 HC RX 637 (ALT 250 FOR IP): Performed by: NURSE PRACTITIONER

## 2024-11-11 PROCEDURE — 93018 CV STRESS TEST I&R ONLY: CPT | Performed by: INTERNAL MEDICINE

## 2024-11-11 PROCEDURE — 36415 COLL VENOUS BLD VENIPUNCTURE: CPT

## 2024-11-11 PROCEDURE — 6360000002 HC RX W HCPCS: Performed by: NURSE PRACTITIONER

## 2024-11-11 PROCEDURE — A9500 TC99M SESTAMIBI: HCPCS | Performed by: FAMILY MEDICINE

## 2024-11-11 PROCEDURE — 2060000000 HC ICU INTERMEDIATE R&B

## 2024-11-11 PROCEDURE — 82947 ASSAY GLUCOSE BLOOD QUANT: CPT

## 2024-11-11 PROCEDURE — 99223 1ST HOSP IP/OBS HIGH 75: CPT | Performed by: INTERNAL MEDICINE

## 2024-11-11 PROCEDURE — 2700000000 HC OXYGEN THERAPY PER DAY

## 2024-11-11 PROCEDURE — 93010 ELECTROCARDIOGRAM REPORT: CPT | Performed by: INTERNAL MEDICINE

## 2024-11-11 PROCEDURE — 93016 CV STRESS TEST SUPVJ ONLY: CPT | Performed by: INTERNAL MEDICINE

## 2024-11-11 PROCEDURE — 83516 IMMUNOASSAY NONANTIBODY: CPT

## 2024-11-11 PROCEDURE — 6360000002 HC RX W HCPCS: Performed by: FAMILY MEDICINE

## 2024-11-11 PROCEDURE — 85610 PROTHROMBIN TIME: CPT

## 2024-11-11 PROCEDURE — 2580000003 HC RX 258: Performed by: NURSE PRACTITIONER

## 2024-11-11 PROCEDURE — 93017 CV STRESS TEST TRACING ONLY: CPT

## 2024-11-11 PROCEDURE — 86038 ANTINUCLEAR ANTIBODIES: CPT

## 2024-11-11 PROCEDURE — 82785 ASSAY OF IGE: CPT

## 2024-11-11 PROCEDURE — 78452 HT MUSCLE IMAGE SPECT MULT: CPT

## 2024-11-11 RX ORDER — SODIUM CHLORIDE 0.9 % (FLUSH) 0.9 %
10 SYRINGE (ML) INJECTION PRN
Status: CANCELLED | OUTPATIENT
Start: 2024-11-11

## 2024-11-11 RX ORDER — PREDNISONE 20 MG/1
40 TABLET ORAL DAILY
Status: COMPLETED | OUTPATIENT
Start: 2024-11-11 | End: 2024-11-13

## 2024-11-11 RX ORDER — ALBUTEROL SULFATE 90 UG/1
2 INHALANT RESPIRATORY (INHALATION) PRN
Status: DISCONTINUED | OUTPATIENT
Start: 2024-11-11 | End: 2024-11-11

## 2024-11-11 RX ORDER — SODIUM CHLORIDE 0.9 % (FLUSH) 0.9 %
5-40 SYRINGE (ML) INJECTION PRN
Status: CANCELLED | OUTPATIENT
Start: 2024-11-11 | End: 2024-11-11

## 2024-11-11 RX ORDER — TETRAKIS(2-METHOXYISOBUTYLISOCYANIDE)COPPER(I) TETRAFLUOROBORATE 1 MG/ML
36 INJECTION, POWDER, LYOPHILIZED, FOR SOLUTION INTRAVENOUS
Status: COMPLETED | OUTPATIENT
Start: 2024-11-11 | End: 2024-11-11

## 2024-11-11 RX ORDER — PREDNISONE 10 MG/1
30 TABLET ORAL DAILY
Status: DISCONTINUED | OUTPATIENT
Start: 2024-11-14 | End: 2024-11-14 | Stop reason: HOSPADM

## 2024-11-11 RX ORDER — METOPROLOL TARTRATE 1 MG/ML
5 INJECTION, SOLUTION INTRAVENOUS EVERY 5 MIN PRN
Status: DISCONTINUED | OUTPATIENT
Start: 2024-11-11 | End: 2024-11-11

## 2024-11-11 RX ORDER — REGADENOSON 0.08 MG/ML
0.4 INJECTION, SOLUTION INTRAVENOUS
Status: CANCELLED | OUTPATIENT
Start: 2024-11-11

## 2024-11-11 RX ORDER — PREDNISONE 20 MG/1
20 TABLET ORAL DAILY
Status: DISCONTINUED | OUTPATIENT
Start: 2024-11-17 | End: 2024-11-14 | Stop reason: HOSPADM

## 2024-11-11 RX ORDER — SODIUM CHLORIDE 0.9 % (FLUSH) 0.9 %
10 SYRINGE (ML) INJECTION PRN
Status: DISCONTINUED | OUTPATIENT
Start: 2024-11-11 | End: 2024-11-14 | Stop reason: HOSPADM

## 2024-11-11 RX ORDER — DIPHENHYDRAMINE HCL 25 MG
25 TABLET ORAL ONCE
Status: COMPLETED | OUTPATIENT
Start: 2024-11-11 | End: 2024-11-11

## 2024-11-11 RX ORDER — PREDNISONE 10 MG/1
10 TABLET ORAL DAILY
Status: DISCONTINUED | OUTPATIENT
Start: 2024-11-20 | End: 2024-11-14 | Stop reason: HOSPADM

## 2024-11-11 RX ORDER — SODIUM CHLORIDE 9 MG/ML
500 INJECTION, SOLUTION INTRAVENOUS CONTINUOUS PRN
Status: DISCONTINUED | OUTPATIENT
Start: 2024-11-11 | End: 2024-11-11

## 2024-11-11 RX ORDER — REGADENOSON 0.08 MG/ML
0.4 INJECTION, SOLUTION INTRAVENOUS
Status: COMPLETED | OUTPATIENT
Start: 2024-11-11 | End: 2024-11-11

## 2024-11-11 RX ORDER — METOPROLOL TARTRATE 1 MG/ML
5 INJECTION, SOLUTION INTRAVENOUS EVERY 5 MIN PRN
Status: CANCELLED | OUTPATIENT
Start: 2024-11-11 | End: 2024-11-11

## 2024-11-11 RX ORDER — CLONAZEPAM 0.5 MG/1
0.5 TABLET ORAL EVERY 12 HOURS PRN
Status: DISCONTINUED | OUTPATIENT
Start: 2024-11-11 | End: 2024-11-14 | Stop reason: HOSPADM

## 2024-11-11 RX ORDER — MONTELUKAST SODIUM 10 MG/1
10 TABLET ORAL NIGHTLY
Status: DISCONTINUED | OUTPATIENT
Start: 2024-11-11 | End: 2024-11-14 | Stop reason: HOSPADM

## 2024-11-11 RX ORDER — SODIUM CHLORIDE 0.9 % (FLUSH) 0.9 %
5-40 SYRINGE (ML) INJECTION PRN
Status: DISCONTINUED | OUTPATIENT
Start: 2024-11-11 | End: 2024-11-11

## 2024-11-11 RX ORDER — LORAZEPAM 2 MG/ML
0.5 INJECTION INTRAMUSCULAR ONCE
Status: COMPLETED | OUTPATIENT
Start: 2024-11-11 | End: 2024-11-11

## 2024-11-11 RX ORDER — AMINOPHYLLINE 25 MG/ML
50 INJECTION, SOLUTION INTRAVENOUS PRN
Status: CANCELLED | OUTPATIENT
Start: 2024-11-11 | End: 2024-11-11

## 2024-11-11 RX ORDER — NITROGLYCERIN 0.4 MG/1
0.4 TABLET SUBLINGUAL EVERY 5 MIN PRN
Status: CANCELLED | OUTPATIENT
Start: 2024-11-11 | End: 2024-11-11

## 2024-11-11 RX ORDER — NITROGLYCERIN 0.4 MG/1
0.4 TABLET SUBLINGUAL EVERY 5 MIN PRN
Status: DISCONTINUED | OUTPATIENT
Start: 2024-11-11 | End: 2024-11-11

## 2024-11-11 RX ORDER — AMINOPHYLLINE 25 MG/ML
50 INJECTION, SOLUTION INTRAVENOUS PRN
Status: DISCONTINUED | OUTPATIENT
Start: 2024-11-11 | End: 2024-11-11

## 2024-11-11 RX ORDER — ATROPINE SULFATE 0.1 MG/ML
0.5 INJECTION INTRAVENOUS EVERY 5 MIN PRN
Status: DISCONTINUED | OUTPATIENT
Start: 2024-11-11 | End: 2024-11-11

## 2024-11-11 RX ORDER — BUDESONIDE AND FORMOTEROL FUMARATE DIHYDRATE 80; 4.5 UG/1; UG/1
2 AEROSOL RESPIRATORY (INHALATION)
Status: DISCONTINUED | OUTPATIENT
Start: 2024-11-11 | End: 2024-11-14 | Stop reason: HOSPADM

## 2024-11-11 RX ORDER — ALBUTEROL SULFATE 90 UG/1
2 INHALANT RESPIRATORY (INHALATION) PRN
Status: CANCELLED | OUTPATIENT
Start: 2024-11-11 | End: 2024-11-11

## 2024-11-11 RX ORDER — TETRAKIS(2-METHOXYISOBUTYLISOCYANIDE)COPPER(I) TETRAFLUOROBORATE 1 MG/ML
14 INJECTION, POWDER, LYOPHILIZED, FOR SOLUTION INTRAVENOUS
Status: COMPLETED | OUTPATIENT
Start: 2024-11-11 | End: 2024-11-11

## 2024-11-11 RX ORDER — SODIUM CHLORIDE 9 MG/ML
500 INJECTION, SOLUTION INTRAVENOUS CONTINUOUS PRN
Status: CANCELLED | OUTPATIENT
Start: 2024-11-11 | End: 2024-11-11

## 2024-11-11 RX ORDER — ATROPINE SULFATE 0.1 MG/ML
0.5 INJECTION INTRAVENOUS EVERY 5 MIN PRN
Status: CANCELLED | OUTPATIENT
Start: 2024-11-11 | End: 2024-11-11

## 2024-11-11 RX ADMIN — OXYCODONE 10 MG: 5 TABLET ORAL at 06:13

## 2024-11-11 RX ADMIN — SODIUM CHLORIDE, PRESERVATIVE FREE 10 ML: 5 INJECTION INTRAVENOUS at 10:34

## 2024-11-11 RX ADMIN — TETRAKIS(2-METHOXYISOBUTYLISOCYANIDE)COPPER(I) TETRAFLUOROBORATE 14 MILLICURIE: 1 INJECTION, POWDER, LYOPHILIZED, FOR SOLUTION INTRAVENOUS at 09:10

## 2024-11-11 RX ADMIN — SODIUM CHLORIDE, PRESERVATIVE FREE 10 ML: 5 INJECTION INTRAVENOUS at 08:59

## 2024-11-11 RX ADMIN — METHYLPREDNISOLONE SODIUM SUCCINATE 40 MG: 40 INJECTION INTRAMUSCULAR; INTRAVENOUS at 08:58

## 2024-11-11 RX ADMIN — BUDESONIDE AND FORMOTEROL FUMARATE DIHYDRATE 2 PUFF: 80; 4.5 AEROSOL RESPIRATORY (INHALATION) at 20:51

## 2024-11-11 RX ADMIN — OXYCODONE 10 MG: 5 TABLET ORAL at 19:11

## 2024-11-11 RX ADMIN — OXYCODONE 10 MG: 5 TABLET ORAL at 12:48

## 2024-11-11 RX ADMIN — IPRATROPIUM BROMIDE AND ALBUTEROL SULFATE 1 DOSE: .5; 2.5 SOLUTION RESPIRATORY (INHALATION) at 07:53

## 2024-11-11 RX ADMIN — METHYLPREDNISOLONE SODIUM SUCCINATE 40 MG: 40 INJECTION INTRAMUSCULAR; INTRAVENOUS at 02:19

## 2024-11-11 RX ADMIN — IPRATROPIUM BROMIDE AND ALBUTEROL SULFATE 1 DOSE: .5; 2.5 SOLUTION RESPIRATORY (INHALATION) at 14:46

## 2024-11-11 RX ADMIN — OXYCODONE 10 MG: 5 TABLET ORAL at 00:02

## 2024-11-11 RX ADMIN — SODIUM CHLORIDE, PRESERVATIVE FREE 10 ML: 5 INJECTION INTRAVENOUS at 09:10

## 2024-11-11 RX ADMIN — METHYLPREDNISOLONE SODIUM SUCCINATE 40 MG: 40 INJECTION INTRAMUSCULAR; INTRAVENOUS at 12:49

## 2024-11-11 RX ADMIN — DIPHENHYDRAMINE HYDROCHLORIDE 25 MG: 50 INJECTION INTRAMUSCULAR; INTRAVENOUS at 00:03

## 2024-11-11 RX ADMIN — MONTELUKAST 10 MG: 10 TABLET, FILM COATED ORAL at 23:15

## 2024-11-11 RX ADMIN — METHOCARBAMOL 750 MG: 750 TABLET ORAL at 19:15

## 2024-11-11 RX ADMIN — SODIUM CHLORIDE, PRESERVATIVE FREE 10 ML: 5 INJECTION INTRAVENOUS at 11:02

## 2024-11-11 RX ADMIN — OXYCODONE 10 MG: 5 TABLET ORAL at 23:15

## 2024-11-11 RX ADMIN — TETRAKIS(2-METHOXYISOBUTYLISOCYANIDE)COPPER(I) TETRAFLUOROBORATE 36 MILLICURIE: 1 INJECTION, POWDER, LYOPHILIZED, FOR SOLUTION INTRAVENOUS at 11:02

## 2024-11-11 RX ADMIN — PREDNISONE 40 MG: 20 TABLET ORAL at 14:31

## 2024-11-11 RX ADMIN — LORAZEPAM 0.5 MG: 2 INJECTION INTRAMUSCULAR; INTRAVENOUS at 09:31

## 2024-11-11 RX ADMIN — IPRATROPIUM BROMIDE AND ALBUTEROL SULFATE 1 DOSE: .5; 2.5 SOLUTION RESPIRATORY (INHALATION) at 20:51

## 2024-11-11 RX ADMIN — CLONAZEPAM 0.5 MG: 0.5 TABLET ORAL at 17:14

## 2024-11-11 RX ADMIN — REGADENOSON 0.4 MG: 0.08 INJECTION, SOLUTION INTRAVENOUS at 11:02

## 2024-11-11 RX ADMIN — SODIUM CHLORIDE, PRESERVATIVE FREE 10 ML: 5 INJECTION INTRAVENOUS at 23:18

## 2024-11-11 RX ADMIN — DIPHENHYDRAMINE HYDROCHLORIDE 25 MG: 25 TABLET ORAL at 02:30

## 2024-11-11 ASSESSMENT — PAIN SCALES - GENERAL
PAINLEVEL_OUTOF10: 7
PAINLEVEL_OUTOF10: 5
PAINLEVEL_OUTOF10: 6
PAINLEVEL_OUTOF10: 6
PAINLEVEL_OUTOF10: 5
PAINLEVEL_OUTOF10: 7
PAINLEVEL_OUTOF10: 7

## 2024-11-11 ASSESSMENT — ENCOUNTER SYMPTOMS
WHEEZING: 0
BLOOD IN STOOL: 0
DIARRHEA: 0
VOMITING: 0
STRIDOR: 0
COUGH: 0
CONSTIPATION: 0
NAUSEA: 0
ABDOMINAL PAIN: 0
SHORTNESS OF BREATH: 1

## 2024-11-11 ASSESSMENT — PAIN DESCRIPTION - LOCATION
LOCATION: BACK
LOCATION: BACK;CHEST
LOCATION: BACK
LOCATION: BACK

## 2024-11-11 ASSESSMENT — PAIN DESCRIPTION - DESCRIPTORS
DESCRIPTORS: DISCOMFORT

## 2024-11-11 ASSESSMENT — PAIN - FUNCTIONAL ASSESSMENT: PAIN_FUNCTIONAL_ASSESSMENT: ACTIVITIES ARE NOT PREVENTED

## 2024-11-11 NOTE — PROGRESS NOTES
Occupational Therapy    [facility]  Occupational Therapy Not Seen Note    DATE: 2024    NAME: Dannielle Bustillo  MRN: [id number]   : 1970      Patient not seen this date for Occupational Therapy due to:    Testing: Pt currently off the floor for Nuclear Stress Test.    Electronically signed by DERRICK KUHN on 2024 at 11:43 AM

## 2024-11-11 NOTE — PLAN OF CARE
Problem: Discharge Planning  Goal: Discharge to home or other facility with appropriate resources  Outcome: Progressing     Problem: Safety - Adult  Goal: Free from fall injury  Outcome: Progressing     Problem: Respiratory - Adult  Goal: Achieves optimal ventilation and oxygenation  11/11/2024 1620 by Virgil De La Fuente RN  Outcome: Progressing  11/11/2024 0801 by Mami Urias RCP  Outcome: Progressing  Flowsheets (Taken 11/11/2024 0755)  Achieves optimal ventilation and oxygenation:   Assess for changes in respiratory status   Assess for changes in mentation and behavior   Position to facilitate oxygenation and minimize respiratory effort   Oxygen supplementation based on oxygen saturation or arterial blood gases   Encourage broncho-pulmonary hygiene including cough, deep breathe, incentive spirometry   Assess the need for suctioning and aspirate as needed   Assess and instruct to report shortness of breath or any respiratory difficulty   Respiratory therapy support as indicated     Problem: Pain  Goal: Verbalizes/displays adequate comfort level or baseline comfort level  Outcome: Progressing     Problem: Skin/Tissue Integrity  Goal: Absence of new skin breakdown  Description: 1.  Monitor for areas of redness and/or skin breakdown  2.  Assess vascular access sites hourly  3.  Every 4-6 hours minimum:  Change oxygen saturation probe site  4.  Every 4-6 hours:  If on nasal continuous positive airway pressure, respiratory therapy assess nares and determine need for appliance change or resting period.  Outcome: Progressing     Problem: ABCDS Injury Assessment  Goal: Absence of physical injury  Outcome: Progressing

## 2024-11-11 NOTE — CONSULTS
No results for input(s): \"BACTERIA\", \"BLOODU\", \"CLARITYU\", \"COLORU\", \"PHUR\", \"PROTEINU\", \"RBCUA\", \"SPECGRAV\", \"BILIRUBINUR\", \"NITRU\", \"WBCUA\", \"LEUKOCYTESUR\", \"GLUCOSEU\" in the last 72 hours.      Nuclear stress test with myocardial perfusion    Result Date: 11/11/2024  Exam is limited due to lack of prone stress imaging.  Mild reversible ischemia of the apical-inferior wall junction is difficult to exclude on the basis of this study. Left ventricular ejection fraction of 70%  Please see report for EKG portion of the examination which will be performed separately by physician from cardiology.  Risk stratification:  [Intermediate risk] Note:  Risk stratification incorporates both clinical history and test results.  Final risk determination is the responsibility of the ordering provider as history and other test results may increase or decrease the risk stratification reported for this examination.  Risk stratification criteria are adapted from \"Noninvasive Risk Stratification\" criteria from Garcia et.  Al, ACC/AATS/AHA/ASE/ASNC/SCAI/SCCT/STS 2017 Appropriate Use Criteria For Coronary Revascularization in Patients With Stable Ischemic Heart Disease  [facility] Volume 69, Issue 17, May 2017 High risk (>3% annual death or MI) 1.  Severe resting LV dysfunction (LVEF >35%) not readily explained by non coronary causes 2.  Resting perfusion abnormalities greater than 10% of the myocardium in patients without prior history or evidence of MI 3.  Stress-induced perfusion abnormalities encumbering greater than or equal to 10% myocardium or stress segmental scores indicating multiple vascular territories with abnormalities 4.  Stress-induced LV dilatation (TID ratio greater than 1.19 for exercise and greater than 1.39 for regadenoson) Intermediate risk (1% to 3% annual death or MI) 1.  Mild/moderate resting LV dysfunction (LVEF 35% to 49%) not readily explained by non coronary causes.  2.  Resting perfusion abnormalities in 5%-9.9% of  the myocardium in patients without a history or prior evidence of MI 3.  Stress-induced perfusion abnormality encumbering 5%-9.9% of the myocardium or stress segmental scores indicating 1 vascular territory with abnormalities but without LV dilation 4.  Small wall motion abnormality involving 1-2 segments and only 1 coronary bed.  Low Risk (Less than 1% annual death or MI) 1. Normal or small myocardial perfusion defect at rest or with stress encumbering less than 5% of the myocardium.  RECOMMENDATIONS:     XR CHEST PORTABLE    Result Date: 11/11/2024  No acute airspace disease identified.     CT CHEST PULMONARY EMBOLISM W CONTRAST    Result Date: 11/10/2024  1. No evidence of pulmonary embolism or acute cardiopulmonary process. 2. Diffuse hepatic steatosis.     XR CHEST PORTABLE    Result Date: 11/9/2024  No acute process.        IMPRESSION:    Patient Active Problem List   Diagnosis Code    Encounter for screening for colorectal malignant neoplasm Z12.11, Z12.12    Respiratory distress R06.03    Acute respiratory failure with hypoxia J96.01    DDD (degenerative disc disease), lumbar M51.369    Obesity (BMI 35.0-39.9 without comorbidity) E66.9    Tobacco use disorder, moderate, in early remission F17.201        History of nasal polyposis with multiple surgeries most recent in May by Dr. Bowser  Highly probable she has asthma.  And this is likely an asthma triad but denies worsening symptoms with nonsteroidals  Cough due to postnasal drainage  Recurrent bronchitis due to postnasal drainage  GERD  Sleep-related breathing disorder            She has seen allergist and had allergy testing with multiple allergies noted.  PLAN:      Due to her recurrent nasal polyposis I am concerned that she likely has asthma triad.  Risk of obstructive lung disease is further increased by his history of smoking.  She gets into coughing spasms due to postnasal drainage and that causes her to get dyspneic.  Advised her to continue with

## 2024-11-11 NOTE — PLAN OF CARE
Problem: Respiratory - Adult  Goal: Achieves optimal ventilation and oxygenation  11/11/2024 0801 by Mami Urias, RCP  Outcome: Progressing  Flowsheets (Taken 11/11/2024 0755)  Achieves optimal ventilation and oxygenation:   Assess for changes in respiratory status   Assess for changes in mentation and behavior   Position to facilitate oxygenation and minimize respiratory effort   Oxygen supplementation based on oxygen saturation or arterial blood gases   Encourage broncho-pulmonary hygiene including cough, deep breathe, incentive spirometry   Assess the need for suctioning and aspirate as needed   Assess and instruct to report shortness of breath or any respiratory difficulty   Respiratory therapy support as indicated

## 2024-11-11 NOTE — PLAN OF CARE
[facility]  Office: [phone]  Gabriel Chicas DO, Sd Jesus DO, Vitaliy Arias DO, Calixto Santos DO, Coleman Hopson MD, Rebeca Wetzel MD, Meme Najera MD, Florencia Pak MD,  Aj Lovett MD, Trish Payne MD, Shyla Duffy MD,  Brett Gasca DO, Anisha Love MD, Yves Pagan MD, Thad Chicas DO, Jolie Do MD,  Naveen Newman DO, Yudy Resendiz MD, Cristin Churchill MD, Kathleen Braxton MD, Shraddha Salazar MD,  Dani Quinteros MD, Jeffrey Engle MD, Chon Mart MD, Everett Forman MD, Luciano Vega MD, Yasir Yi MD, Judah Cottrell DO, Christopher Lujan MD, Shirley Waterhouse, CNP,  Coco Shepherd, CNP, Judah Asif, CNP,  Raven Laura, NANETTE, Amparo Marin, CNP, Linda Bess, CNP, Zuly Mcgee, CNP, Kelsey Hatfield, CNP, LOU MoultonC, LOU SouzaC, Lexie Johnson, CNP, Shahzad Oliver, CNP,  Payal Tran, CNP, Lainey Polanco, CNP,  Lashonda Avilez, CNP, Ramonita Craig, CNP         [facility]   IN-PATIENT SERVICE   [facility]    Second Visit Note  For more detailed information please refer to the progress note of the day      11/11/2024    5:42 PM    Name:   Dannielle Bustillo  MRN:     [id number]     Acct:      [id number]   Room:   0541/0541-01   Day:  1  Admit Date:  11/9/2024  7:47 PM    PCP:   Gil Black MD  Code Status:  Full Code      Pt vitals were reviewed   New labs were reviewed   Patient was seen, family at bedside, seen by pulmonology,  Stress test results reviewed with the patient, moderate risk will add cardiology evaluation, her echo still pending.  Patient continues to be anxious and likely steroids are not helping with that we will add small dose oral Klonopin as needed            Florencia Pak MD  11/11/2024  5:42 PM

## 2024-11-11 NOTE — PLAN OF CARE
Problem: Discharge Planning  Goal: Discharge to home or other facility with appropriate resources  11/10/2024 2352 by Génesis Saavedra RN  Outcome: Progressing  11/10/2024 1048 by Sandrita Christiansen RN  Outcome: Progressing     Problem: Safety - Adult  Goal: Free from fall injury  11/10/2024 2352 by Génesis Saavedra RN  Outcome: Progressing  Flowsheets (Taken 11/10/2024 1410 by Lainey Cope RN)  Free From Fall Injury: Instruct family/caregiver on patient safety  11/10/2024 1048 by Sandrita Christiansen RN  Outcome: Progressing     Problem: Respiratory - Adult  Goal: Achieves optimal ventilation and oxygenation  11/10/2024 2352 by Génesis Saavedra RN  Outcome: Progressing  11/10/2024 1048 by Sandrita Christiansen RN  Outcome: Progressing  Flowsheets (Taken 11/10/2024 0808 by Christina Herman RCP)  Achieves optimal ventilation and oxygenation:   Assess for changes in respiratory status   Respiratory therapy support as indicated     Problem: Pain  Goal: Verbalizes/displays adequate comfort level or baseline comfort level  11/10/2024 2352 by Génesis Saavedra RN  Outcome: Progressing  11/10/2024 1048 by Sandrita Christiansen RN  Outcome: Progressing     Problem: Skin/Tissue Integrity  Goal: Absence of new skin breakdown  Description: 1.  Monitor for areas of redness and/or skin breakdown  2.  Assess vascular access sites hourly  3.  Every 4-6 hours minimum:  Change oxygen saturation probe site  4.  Every 4-6 hours:  If on nasal continuous positive airway pressure, respiratory therapy assess nares and determine need for appliance change or resting period.  11/10/2024 2352 by Génesis Saavedra RN  Outcome: Progressing  11/10/2024 1048 by Sandrita Christiansen RN  Outcome: Progressing     Problem: ABCDS Injury Assessment  Goal: Absence of physical injury  Outcome: Progressing

## 2024-11-11 NOTE — PROGRESS NOTES
much better once oxygen is on .  EKG and tropes were done during afternoon episode and were unremarkable  She is anxious and tachycardic this morning  She is scheduled for stress test this a.m.  Patient vitals, labs and all providers notes were reviewed,from overnight shift and morning updates were noted and discussed with the nurse    Brief History:     Per HPI  54-year-old female with past medical history of chronic smoker 30-pack-year smoking history 1.5 packs/day(quit 2 months ago), degenerative disc disease, hypertension presented to ED in respiratory distress.  Patient complained of significant difficulty breathing before coming to the hospital.  Per EMS patient was saturating 82% on room air and was having difficulty speaking full sentences.  Patient was placed on CPAP and was given Solu-Medrol 125 mg IV x 1 and magnesium.  Patient mentioned that she was recently placed on antibiotic for pneumonia which she finished yesterday.  Of note patient was treated for bronchitis on 10/28/2024 with steroids and antibiotics.  Patient has not formally been diagnosed with COPD.  Patient did mention that she has been having dyspnea on exertion with climbing up stairs, taking groceries for quite some time.  She quit smoking 2 months ago and her symptoms exacerbated after that.  Patient denies any chest pain, nausea, vomiting, dizziness, lightheadedness.  ED patient was able to come off of BiPAP placed on 2 L of oxygen, she was desatting on room air.  Patient continued to remain tachycardic and dyspnea on exertion.      Review of Systems:     Review of Systems   Constitutional:  Positive for activity change and appetite change. Negative for chills, diaphoresis and fever.   HENT:  Negative for congestion and hearing loss.    Respiratory:  Positive for shortness of breath. Negative for cough, wheezing and stridor.    Cardiovascular:  Negative for chest pain, palpitations and leg swelling.   Gastrointestinal:  Negative for  data filed at 11/10/2024 1416  Gross per 24 hour   Intake 520 ml   Output --   Net 520 ml       Labs:  Hematology:  Recent Labs     11/09/24  2021 11/10/24  0838 11/11/24  0558   WBC 14.9* 13.0* 18.5*   RBC 4.59 4.45 4.27   HGB 13.5 12.8 12.4   HCT 42.1 41.0 40.3   MCV 91.7 92.1 94.4   MCH 29.4 28.8 29.0   MCHC 32.1 31.2 30.8   RDW 14.5* 14.8* 15.2*    315 325   MPV 10.8 10.5 11.0   INR  --   --  1.0   DDIMER 1.60*  --   --      Chemistry:  Recent Labs     11/09/24  1955 11/09/24  2021 11/09/24  2038 11/09/24  2038 11/10/24  0104 11/10/24  0838 11/10/24  1359   NA  --   --  139  --   --  138  --    K  --   --  3.8  --   --  4.4  --    CL  --   --  104  --   --  104  --    CO2  --   --  22  --   --  21  --    GLUCOSE  --   --  163*  --   --  172*  --    BUN  --   --  22*  --   --  15  --    CREATININE 0.7  --  0.8  --   --  0.7  --    ANIONGAP  --   --  13  --   --  13  --    LABGLOM  --   --  89  --   --  >90  --    CALCIUM  --   --  8.8  --   --  9.3  --    PROBNP  --   --   --   --  <36  --   --    TROPHS  --   --  <6   < > 10 <6 <6   LACTACIDWB  --  1.7  --   --   --   --   --     < > = values in this interval not displayed.     Recent Labs     11/09/24  1955 11/09/24  2038 11/10/24  0838   AST  --  41* 31   ALT  --  44* 39*   ALKPHOS  --  103 96   BILITOT  --  0.2 0.2   POCGLU 161*  --   --      ABG:  Lab Results   Component Value Date/Time    FIO2 INFORMATION NOT PROVIDED 11/10/2024 01:04 AM     Lab Results   Component Value Date/Time    SPECIAL L HAND 6 ML 11/09/2024 08:30 PM     Lab Results   Component Value Date/Time    CULTURE NO GROWTH 1 DAY 11/09/2024 08:30 PM       Radiology:  CT CHEST PULMONARY EMBOLISM W CONTRAST    Result Date: 11/10/2024  1. No evidence of pulmonary embolism or acute cardiopulmonary process. 2. Diffuse hepatic steatosis.     XR CHEST PORTABLE    Result Date: 11/9/2024  No acute process.       Physical Examination:        Physical Exam  Vitals and nursing note reviewed.

## 2024-11-12 ENCOUNTER — APPOINTMENT (OUTPATIENT)
Age: 54
DRG: 189 | End: 2024-11-12
Attending: FAMILY MEDICINE
Payer: COMMERCIAL

## 2024-11-12 PROBLEM — I10 ESSENTIAL HYPERTENSION: Status: ACTIVE | Noted: 2024-11-12

## 2024-11-12 PROBLEM — R00.0 TACHYCARDIA: Status: ACTIVE | Noted: 2024-11-12

## 2024-11-12 PROBLEM — R94.39 ABNORMAL STRESS TEST: Status: ACTIVE | Noted: 2024-11-12

## 2024-11-12 PROBLEM — R06.02 SHORTNESS OF BREATH: Status: ACTIVE | Noted: 2024-11-12

## 2024-11-12 LAB
ANA SER QL IA: NEGATIVE
ANCA MYELOPEROXIDASE: 1.8 AU/ML (ref 0–3.5)
ANCA PROTEINASE 3: <0.7 AU/ML (ref 0–2)
BASOPHILS # BLD: 0.04 K/UL (ref 0–0.2)
BASOPHILS NFR BLD: 0 % (ref 0–2)
DSDNA IGG SER QL IA: 2.7 IU/ML
ECHO AO ROOT DIAM: 2.7 CM
ECHO AO ROOT INDEX: 1.24 CM/M2
ECHO AV AREA PEAK VELOCITY: 2.5 CM2
ECHO AV AREA VTI: 2.5 CM2
ECHO AV AREA/BSA PEAK VELOCITY: 1.1 CM2/M2
ECHO AV AREA/BSA VTI: 1.1 CM2/M2
ECHO AV MEAN GRADIENT: 3 MMHG
ECHO AV MEAN VELOCITY: 0.9 M/S
ECHO AV PEAK GRADIENT: 7 MMHG
ECHO AV PEAK VELOCITY: 1.3 M/S
ECHO AV VELOCITY RATIO: 0.85
ECHO AV VTI: 26.9 CM
ECHO BSA: 2.27 M2
ECHO LA AREA 2C: 16.1 CM2
ECHO LA AREA 4C: 11.6 CM2
ECHO LA DIAMETER INDEX: 1.47 CM/M2
ECHO LA DIAMETER: 3.2 CM
ECHO LA MAJOR AXIS: 4.8 CM
ECHO LA MINOR AXIS: 5.4 CM
ECHO LA TO AORTIC ROOT RATIO: 1.19
ECHO LA VOL BP: 31 ML (ref 22–52)
ECHO LA VOL MOD A2C: 39 ML (ref 22–52)
ECHO LA VOL MOD A4C: 23 ML (ref 22–52)
ECHO LA VOL/BSA BIPLANE: 14 ML/M2 (ref 16–34)
ECHO LA VOLUME INDEX MOD A2C: 18 ML/M2 (ref 16–34)
ECHO LA VOLUME INDEX MOD A4C: 11 ML/M2 (ref 16–34)
ECHO LV E' LATERAL VELOCITY: 6.4 CM/S
ECHO LV E' SEPTAL VELOCITY: 6.5 CM/S
ECHO LV EDV A2C: 54 ML
ECHO LV EDV A4C: 46 ML
ECHO LV EDV INDEX A4C: 21 ML/M2
ECHO LV EDV NDEX A2C: 25 ML/M2
ECHO LV EF PHYSICIAN: 60 %
ECHO LV EJECTION FRACTION A2C: 69 %
ECHO LV EJECTION FRACTION A4C: 65 %
ECHO LV EJECTION FRACTION BIPLANE: 66 % (ref 55–100)
ECHO LV ESV A2C: 17 ML
ECHO LV ESV A4C: 16 ML
ECHO LV ESV INDEX A2C: 8 ML/M2
ECHO LV ESV INDEX A4C: 7 ML/M2
ECHO LV FRACTIONAL SHORTENING: 15 % (ref 28–44)
ECHO LV INTERNAL DIMENSION DIASTOLE INDEX: 1.79 CM/M2
ECHO LV INTERNAL DIMENSION DIASTOLIC: 3.9 CM (ref 3.9–5.3)
ECHO LV INTERNAL DIMENSION SYSTOLIC INDEX: 1.51 CM/M2
ECHO LV INTERNAL DIMENSION SYSTOLIC: 3.3 CM
ECHO LV IVSD: 0.8 CM (ref 0.6–0.9)
ECHO LV MASS 2D: 97.4 G (ref 67–162)
ECHO LV MASS INDEX 2D: 44.7 G/M2 (ref 43–95)
ECHO LV POSTERIOR WALL DIASTOLIC: 0.9 CM (ref 0.6–0.9)
ECHO LV RELATIVE WALL THICKNESS RATIO: 0.46
ECHO LVOT AREA: 3.1 CM2
ECHO LVOT AV VTI INDEX: 0.81
ECHO LVOT DIAM: 2 CM
ECHO LVOT MEAN GRADIENT: 2 MMHG
ECHO LVOT PEAK GRADIENT: 4 MMHG
ECHO LVOT PEAK VELOCITY: 1.1 M/S
ECHO LVOT STROKE VOLUME INDEX: 31.3 ML/M2
ECHO LVOT SV: 68.1 ML
ECHO LVOT VTI: 21.7 CM
ECHO MV A VELOCITY: 0.72 M/S
ECHO MV AREA VTI: 2.3 CM2
ECHO MV E DECELERATION TIME (DT): 197 MS
ECHO MV E VELOCITY: 0.87 M/S
ECHO MV E/A RATIO: 1.21
ECHO MV E/E' LATERAL: 13.59
ECHO MV E/E' RATIO (AVERAGED): 13.49
ECHO MV E/E' SEPTAL: 13.38
ECHO MV LVOT VTI INDEX: 1.39
ECHO MV MAX VELOCITY: 0.9 M/S
ECHO MV MEAN GRADIENT: 2 MMHG
ECHO MV MEAN VELOCITY: 0.7 M/S
ECHO MV PEAK GRADIENT: 3 MMHG
ECHO MV VTI: 30.2 CM
ECHO PV MAX VELOCITY: 0.9 M/S
ECHO PV PEAK GRADIENT: 3 MMHG
ECHO RA AREA 4C: 21.9 CM2
ECHO RA END SYSTOLIC VOLUME APICAL 4 CHAMBER INDEX BSA: 36 ML/M2
ECHO RA VOLUME: 78 ML
ECHO RV BASAL DIMENSION: 4.2 CM
ECHO RV FREE WALL PEAK S': 11.5 CM/S
ECHO RV MID DIMENSION: 2.8 CM
ECHO RV TAPSE: 2.1 CM (ref 1.7–?)
EOSINOPHIL # BLD: <0.03 K/UL (ref 0–0.44)
EOSINOPHILS RELATIVE PERCENT: 0 % (ref 1–4)
ERYTHROCYTE [DISTWIDTH] IN BLOOD BY AUTOMATED COUNT: 15.1 % (ref 11.8–14.4)
EST. AVERAGE GLUCOSE BLD GHB EST-MCNC: 123 MG/DL
HBA1C MFR BLD: 5.9 % (ref 4–6)
HCT VFR BLD AUTO: 40.4 % (ref 36.3–47.1)
HGB BLD-MCNC: 12.4 G/DL (ref 11.9–15.1)
IGE SERPL-ACNC: 48 IU/ML (ref 0–100)
IMM GRANULOCYTES # BLD AUTO: 0.26 K/UL (ref 0–0.3)
IMM GRANULOCYTES NFR BLD: 1 %
LYMPHOCYTES NFR BLD: 2.63 K/UL (ref 1.1–3.7)
LYMPHOCYTES RELATIVE PERCENT: 12 % (ref 24–43)
MCH RBC QN AUTO: 29.6 PG (ref 25.2–33.5)
MCHC RBC AUTO-ENTMCNC: 30.7 G/DL (ref 28.4–34.8)
MCV RBC AUTO: 96.4 FL (ref 82.6–102.9)
MICROORGANISM SPEC CULT: NORMAL
MICROORGANISM/AGENT SPEC: NORMAL
MONOCYTES NFR BLD: 1.2 K/UL (ref 0.1–1.2)
MONOCYTES NFR BLD: 6 % (ref 3–12)
NEUTROPHILS NFR BLD: 81 % (ref 36–65)
NEUTS SEG NFR BLD: 17.15 K/UL (ref 1.5–8.1)
NRBC BLD-RTO: 0 PER 100 WBC
NUCLEAR IGG SER IA-RTO: 0.3 U/ML
PLATELET # BLD AUTO: 348 K/UL (ref 138–453)
PMV BLD AUTO: 10.6 FL (ref 8.1–13.5)
RBC # BLD AUTO: 4.19 M/UL (ref 3.95–5.11)
RBC # BLD: ABNORMAL 10*6/UL
SERVICE CMNT-IMP: NORMAL
SPECIMEN DESCRIPTION: NORMAL
WBC OTHER # BLD: 21.3 K/UL (ref 3.5–11.3)

## 2024-11-12 PROCEDURE — 93306 TTE W/DOPPLER COMPLETE: CPT | Performed by: INTERNAL MEDICINE

## 2024-11-12 PROCEDURE — 93306 TTE W/DOPPLER COMPLETE: CPT

## 2024-11-12 PROCEDURE — 6370000000 HC RX 637 (ALT 250 FOR IP): Performed by: FAMILY MEDICINE

## 2024-11-12 PROCEDURE — 6370000000 HC RX 637 (ALT 250 FOR IP): Performed by: STUDENT IN AN ORGANIZED HEALTH CARE EDUCATION/TRAINING PROGRAM

## 2024-11-12 PROCEDURE — 97165 OT EVAL LOW COMPLEX 30 MIN: CPT

## 2024-11-12 PROCEDURE — 2580000003 HC RX 258: Performed by: NURSE PRACTITIONER

## 2024-11-12 PROCEDURE — 6370000000 HC RX 637 (ALT 250 FOR IP)

## 2024-11-12 PROCEDURE — 6370000000 HC RX 637 (ALT 250 FOR IP): Performed by: INTERNAL MEDICINE

## 2024-11-12 PROCEDURE — 85025 COMPLETE CBC W/AUTO DIFF WBC: CPT

## 2024-11-12 PROCEDURE — 99222 1ST HOSP IP/OBS MODERATE 55: CPT | Performed by: INTERNAL MEDICINE

## 2024-11-12 PROCEDURE — 6370000000 HC RX 637 (ALT 250 FOR IP): Performed by: NURSE PRACTITIONER

## 2024-11-12 PROCEDURE — 94761 N-INVAS EAR/PLS OXIMETRY MLT: CPT

## 2024-11-12 PROCEDURE — 97535 SELF CARE MNGMENT TRAINING: CPT

## 2024-11-12 PROCEDURE — 99232 SBSQ HOSP IP/OBS MODERATE 35: CPT | Performed by: INTERNAL MEDICINE

## 2024-11-12 PROCEDURE — 97530 THERAPEUTIC ACTIVITIES: CPT

## 2024-11-12 PROCEDURE — 99232 SBSQ HOSP IP/OBS MODERATE 35: CPT | Performed by: STUDENT IN AN ORGANIZED HEALTH CARE EDUCATION/TRAINING PROGRAM

## 2024-11-12 PROCEDURE — 36415 COLL VENOUS BLD VENIPUNCTURE: CPT

## 2024-11-12 PROCEDURE — 94640 AIRWAY INHALATION TREATMENT: CPT

## 2024-11-12 PROCEDURE — 83036 HEMOGLOBIN GLYCOSYLATED A1C: CPT

## 2024-11-12 PROCEDURE — 82785 ASSAY OF IGE: CPT

## 2024-11-12 PROCEDURE — 2060000000 HC ICU INTERMEDIATE R&B

## 2024-11-12 RX ORDER — MONTELUKAST SODIUM 10 MG/1
10 TABLET ORAL NIGHTLY
Qty: 30 TABLET | Refills: 1 | Status: SHIPPED | OUTPATIENT
Start: 2024-11-12

## 2024-11-12 RX ORDER — ASPIRIN 81 MG/1
81 TABLET ORAL DAILY
Status: DISCONTINUED | OUTPATIENT
Start: 2024-11-12 | End: 2024-11-14 | Stop reason: HOSPADM

## 2024-11-12 RX ORDER — PANTOPRAZOLE SODIUM 40 MG/1
40 TABLET, DELAYED RELEASE ORAL
Status: DISCONTINUED | OUTPATIENT
Start: 2024-11-12 | End: 2024-11-14 | Stop reason: HOSPADM

## 2024-11-12 RX ORDER — ALBUTEROL SULFATE 90 UG/1
2 INHALANT RESPIRATORY (INHALATION) EVERY 6 HOURS PRN
Status: DISCONTINUED | OUTPATIENT
Start: 2024-11-12 | End: 2024-11-14 | Stop reason: HOSPADM

## 2024-11-12 RX ORDER — IPRATROPIUM BROMIDE AND ALBUTEROL SULFATE 2.5; .5 MG/3ML; MG/3ML
3 SOLUTION RESPIRATORY (INHALATION) EVERY 4 HOURS PRN
Qty: 360 ML | Refills: 1 | Status: SHIPPED | OUTPATIENT
Start: 2024-11-12

## 2024-11-12 RX ORDER — CARVEDILOL 6.25 MG/1
6.25 TABLET ORAL 2 TIMES DAILY WITH MEALS
Status: DISCONTINUED | OUTPATIENT
Start: 2024-11-12 | End: 2024-11-13

## 2024-11-12 RX ORDER — ALBUTEROL SULFATE 90 UG/1
2 INHALANT RESPIRATORY (INHALATION) EVERY 6 HOURS PRN
Qty: 18 G | Refills: 1 | Status: SHIPPED | OUTPATIENT
Start: 2024-11-12

## 2024-11-12 RX ORDER — ATORVASTATIN CALCIUM 20 MG/1
20 TABLET, FILM COATED ORAL NIGHTLY
Status: DISCONTINUED | OUTPATIENT
Start: 2024-11-12 | End: 2024-11-14 | Stop reason: HOSPADM

## 2024-11-12 RX ORDER — BUDESONIDE AND FORMOTEROL FUMARATE DIHYDRATE 80; 4.5 UG/1; UG/1
2 AEROSOL RESPIRATORY (INHALATION)
Qty: 10.2 G | Refills: 1 | Status: SHIPPED | OUTPATIENT
Start: 2024-11-12

## 2024-11-12 RX ADMIN — SODIUM CHLORIDE, PRESERVATIVE FREE 10 ML: 5 INJECTION INTRAVENOUS at 08:51

## 2024-11-12 RX ADMIN — METHOCARBAMOL 750 MG: 750 TABLET ORAL at 12:56

## 2024-11-12 RX ADMIN — CLONAZEPAM 0.5 MG: 0.5 TABLET ORAL at 08:50

## 2024-11-12 RX ADMIN — ATORVASTATIN CALCIUM 20 MG: 20 TABLET, FILM COATED ORAL at 20:55

## 2024-11-12 RX ADMIN — CARVEDILOL 6.25 MG: 6.25 TABLET, FILM COATED ORAL at 08:50

## 2024-11-12 RX ADMIN — BUDESONIDE AND FORMOTEROL FUMARATE DIHYDRATE 2 PUFF: 80; 4.5 AEROSOL RESPIRATORY (INHALATION) at 08:09

## 2024-11-12 RX ADMIN — MONTELUKAST 10 MG: 10 TABLET, FILM COATED ORAL at 20:55

## 2024-11-12 RX ADMIN — IPRATROPIUM BROMIDE AND ALBUTEROL SULFATE 1 DOSE: .5; 2.5 SOLUTION RESPIRATORY (INHALATION) at 13:57

## 2024-11-12 RX ADMIN — METHOCARBAMOL 750 MG: 750 TABLET ORAL at 21:29

## 2024-11-12 RX ADMIN — ASPIRIN 81 MG: 81 TABLET, COATED ORAL at 17:20

## 2024-11-12 RX ADMIN — OXYCODONE 10 MG: 5 TABLET ORAL at 12:56

## 2024-11-12 RX ADMIN — METHOCARBAMOL 750 MG: 750 TABLET ORAL at 05:06

## 2024-11-12 RX ADMIN — IPRATROPIUM BROMIDE AND ALBUTEROL SULFATE 1 DOSE: .5; 2.5 SOLUTION RESPIRATORY (INHALATION) at 08:01

## 2024-11-12 RX ADMIN — SODIUM CHLORIDE, PRESERVATIVE FREE 10 ML: 5 INJECTION INTRAVENOUS at 20:56

## 2024-11-12 RX ADMIN — PREDNISONE 40 MG: 20 TABLET ORAL at 08:50

## 2024-11-12 RX ADMIN — OXYCODONE 10 MG: 5 TABLET ORAL at 08:50

## 2024-11-12 RX ADMIN — OXYCODONE 10 MG: 5 TABLET ORAL at 17:19

## 2024-11-12 RX ADMIN — IPRATROPIUM BROMIDE AND ALBUTEROL SULFATE 1 DOSE: 2.5; .5 SOLUTION RESPIRATORY (INHALATION) at 17:27

## 2024-11-12 RX ADMIN — CARVEDILOL 6.25 MG: 6.25 TABLET, FILM COATED ORAL at 17:20

## 2024-11-12 RX ADMIN — OXYCODONE 10 MG: 5 TABLET ORAL at 03:22

## 2024-11-12 RX ADMIN — PANTOPRAZOLE SODIUM 40 MG: 40 TABLET, DELAYED RELEASE ORAL at 08:50

## 2024-11-12 RX ADMIN — OXYCODONE 10 MG: 5 TABLET ORAL at 21:29

## 2024-11-12 ASSESSMENT — PAIN DESCRIPTION - PAIN TYPE: TYPE: CHRONIC PAIN

## 2024-11-12 ASSESSMENT — PAIN DESCRIPTION - DESCRIPTORS
DESCRIPTORS: ACHING;DISCOMFORT
DESCRIPTORS: ACHING
DESCRIPTORS: ACHING
DESCRIPTORS: SPASM
DESCRIPTORS: DISCOMFORT

## 2024-11-12 ASSESSMENT — PAIN SCALES - GENERAL
PAINLEVEL_OUTOF10: 7
PAINLEVEL_OUTOF10: 7
PAINLEVEL_OUTOF10: 6
PAINLEVEL_OUTOF10: 7
PAINLEVEL_OUTOF10: 5
PAINLEVEL_OUTOF10: 7
PAINLEVEL_OUTOF10: 5
PAINLEVEL_OUTOF10: 7
PAINLEVEL_OUTOF10: 5
PAINLEVEL_OUTOF10: 7
PAINLEVEL_OUTOF10: 3
PAINLEVEL_OUTOF10: 5
PAINLEVEL_OUTOF10: 6

## 2024-11-12 ASSESSMENT — PAIN DESCRIPTION - LOCATION
LOCATION: BACK

## 2024-11-12 ASSESSMENT — PAIN - FUNCTIONAL ASSESSMENT
PAIN_FUNCTIONAL_ASSESSMENT: ACTIVITIES ARE NOT PREVENTED
PAIN_FUNCTIONAL_ASSESSMENT: ACTIVITIES ARE NOT PREVENTED
PAIN_FUNCTIONAL_ASSESSMENT: PREVENTS OR INTERFERES SOME ACTIVE ACTIVITIES AND ADLS

## 2024-11-12 ASSESSMENT — PAIN DESCRIPTION - ORIENTATION: ORIENTATION: LOWER

## 2024-11-12 NOTE — PLAN OF CARE
Problem: Respiratory - Adult  Goal: Achieves optimal ventilation and oxygenation  11/12/2024 0807 by Sara Sosa, RCP  Outcome: Progressing  Flowsheets (Taken 11/12/2024 0801)  Achieves optimal ventilation and oxygenation:   Assess for changes in respiratory status   Oxygen supplementation based on oxygen saturation or arterial blood gases   Assess and instruct to report shortness of breath or any respiratory difficulty   Respiratory therapy support as indicated

## 2024-11-12 NOTE — PROGRESS NOTES
[facility]    [facility]   IN-PATIENT SERVICE   [facility]    Progress Note    11/12/2024    6:06 PM    Name:   Dannielle Bustillo  MRN:     [id number]     Acct:      [id number]   Room:   41/0541-01   Day:  2  Admit Date:  11/9/2024  7:47 PM    PCP:   Gil Black MD  Code Status:  Full Code    Subjective:     C/C:   Chief Complaint   Patient presents with    Respiratory Distress     Pt arrives per EMS with c/o difficulty breathing. Per EMS, oxygen saturation @ 82% on RA, speaking in 2-3 word phrases. Pt placed on C-PAP per EMS, SpO2-99%. Pt given 125mg solumedrol IVP and 2Gm Mg en route to ED. Pt reports history of pneumonia, just finished po antibiotics yesterday.      Interval History Status: improved.     Seen at bedside, hemodynamically stable, continues to have high blood pressure,, Coreg added  Breathing/chest tightness improved from before    Brief History:     54-year-old female presented with shortness of breath, has known history of smoking, GERD, multiple allergies, recent nasal polypectomy, has been treated for bronchitis/pneumonia recently, patient states that she tried albuterol/nebulizer for her breathing difficulty however did not improve which is why she came to the ER, has been having difficulty lying flat as well, started on steroids for COPD exacerbation, seen by pulm, recommend doing PFT/sleep study as outpatient, found to have abnormal stress test, cardiology service consulted    Medications:     Allergies:  No Known Allergies    Current Meds:   Scheduled Meds:    pantoprazole  40 mg Oral QAM AC    carvedilol  6.25 mg Oral BID WC    aspirin  81 mg Oral Daily    atorvastatin  20 mg Oral Nightly    predniSONE  40 mg Oral Daily    Followed by    [START ON 11/14/2024] predniSONE  30 mg Oral Daily    Followed by    [START ON 11/17/2024] predniSONE  20 mg Oral Daily    Followed by    [START ON 11/20/2024] predniSONE  10 mg Oral Daily    budesonide-formoterol  31.2 30.8 30.7   RDW 14.5* 14.8* 15.2* 15.1*    315 325 348   MPV 10.8 10.5 11.0 10.6   INR  --   --  1.0  --    DDIMER 1.60*  --   --   --      Chemistry:  Recent Labs     11/09/24  1955 11/09/24  2021 11/09/24  2038 11/09/24  2038 11/10/24  0104 11/10/24  0838 11/10/24  1359   NA  --   --  139  --   --  138  --    K  --   --  3.8  --   --  4.4  --    CL  --   --  104  --   --  104  --    CO2  --   --  22  --   --  21  --    GLUCOSE  --   --  163*  --   --  172*  --    BUN  --   --  22*  --   --  15  --    CREATININE 0.7  --  0.8  --   --  0.7  --    ANIONGAP  --   --  13  --   --  13  --    LABGLOM  --   --  89  --   --  >90  --    CALCIUM  --   --  8.8  --   --  9.3  --    PROBNP  --   --   --   --  <36  --   --    TROPHS  --   --  <6   < > 10 <6 <6   LACTACIDWB  --  1.7  --   --   --   --   --     < > = values in this interval not displayed.     Recent Labs     11/09/24  1955 11/09/24  2038 11/10/24  0838 11/11/24  2007 11/12/24  0605   LABA1C  --   --   --   --  5.9   AST  --  41* 31  --   --    ALT  --  44* 39*  --   --    ALKPHOS  --  103 96  --   --    BILITOT  --  0.2 0.2  --   --    POCGLU 161*  --   --  236*  --      ABG:  Lab Results   Component Value Date/Time    FIO2 INFORMATION NOT PROVIDED 11/10/2024 01:04 AM     Lab Results   Component Value Date/Time    SPECIAL Site: Sputum 11/10/2024 08:14 PM     Lab Results   Component Value Date/Time    CULTURE NORMAL RESPIRATORY ZEESHAN MODERATE GROWTH 11/10/2024 08:14 PM       Radiology:  Nuclear stress test with myocardial perfusion    Result Date: 11/11/2024  Exam is limited due to lack of prone stress imaging.  Mild reversible ischemia of the apical-inferior wall junction is difficult to exclude on the basis of this study. Left ventricular ejection fraction of 70%  Please see report for EKG portion of the examination which will be performed separately by physician from cardiology.  Risk stratification:  [Intermediate risk] Note:  Risk stratification

## 2024-11-12 NOTE — CONSULTS
Cardiology Consultation         Date:   11/12/2024  Patient name: Dannielle Bustillo  Date of admission:  11/9/2024  7:47 PM  MRN:   [id number]  YOB: 1970    Reason for Admission: acute hypoxic resp failure     Chief Complaint:  worsening dyspnea     History of present illness:     54-year-old female with past medical history of chronic smoking around 30-pack-year smoking history 1.5 packs/day(quit 2 months ago), degenerative disc disease, hypertension presented to ED with increasing dyspnea over last few days and in respiratory distress.   Per EMS patient was saturating 82% on room air and was having difficulty speaking full sentences.  Patient was placed on CPAP and was given Solu-Medrol 125 mg IV x 1 and magnesium.  Patient mentioned that she was recently placed on antibiotic for pneumonia which she finished yesterday.  Later she had an episode of chest pain. Pharmacological stress test was ordered which is as below. Patient denies any recurrent anginal pain. No significant leg edema or orthopnea.         Past Medical History:   has a past medical history of DDD (degenerative disc disease), cervical, DDD (degenerative disc disease), lumbar, Hypertension, Loss of perception for taste, Nasal congestion, Nasal polyps, and Spinal cord stimulator status.    Past Surgical History:   has a past surgical history that includes Cholecystectomy; back surgery; Tubal ligation; Tonsillectomy and adenoidectomy; [place] tooth extraction; Nasal polyp surgery; and Sinus endoscopy (N/A, 5/29/2024).     Home Medications:    Prior to Admission medications    Medication Sig Start Date End Date Taking? Authorizing Provider   methocarbamol (ROBAXIN) 750 MG tablet Take 1 tablet by mouth 3 times daily   Yes ProviderCedrick MD   oxyCODONE HCl (OXY-IR) 10 MG immediate release tablet Take 1 tablet by mouth every 4 hours as needed for Pain. 8/30/24  Yes ProviderCedrick MD   bisacodyl (DULCOLAX) 5 MG EC tablet  Take 4 tablets at 10:00 am day before procedure  Patient not taking: Reported on 11/10/2024 10/14/24   Kate Nazario APRN - CNP   polyethylene glycol (MIRALAX) 17 GM/SCOOP powder Mix 238 grams of Miralax with 64 ounces Gatorade (no red or purple) start drinking at 6:00 pm finish by 8:00 pm all of the prep  Patient not taking: Reported on 11/10/2024 10/14/24   Kate Nazario APRN - CNP   ondansetron (ZOFRAN) 4 MG tablet Take 1 tablet by mouth every 6 hours as needed for Nausea or Vomiting 10/14/24   Kate Nazario APRN - CNP       Allergies:  Patient has no known allergies.    Social History:   reports that she has quit smoking. Her smoking use included cigarettes. She has a 15 pack-year smoking history. She has never used smokeless tobacco. She reports current alcohol use. She reports that she does not use drugs.     Family History:      REVIEW OF SYSTEMS:    Constitutional: there has been decline in functional capacity x 2 weeks due to dyspnea .     Eyes: No visual changes or diplopia.   ENT: + post nasal drip   Cardiovascular: as described in HPI   Respiratory: +ve for hx of non- productive cough, pleuritic chest pain   Gastrointestinal: No abdominal pain, appetite loss, blood in stools. No change in bowel habits.  Genitourinary: No dysuria, trouble voiding, or hematuria.  Musculoskeletal:  No gait disturbance, No weakness or joint complaints.  Integumentary: No rash or pruritis.  Neurological: No headache, weakness, numbness or tingling. No change in gait, balance, coordination.  Psychiatric: No anxiety, or depression.  Endocrine: No temperature intolerance. No excessive thirst, fluid intake, or urination. No tremor.  Hematologic/Lymphatic: No abnormal bruising or bleeding, blood clots or swollen lymph nodes.  Allergic/Immunologic: No nasal congestion or hives.    PHYSICAL EXAM:    Physical Examination:    BP (!) 161/98   Pulse 90   Temp 98.1 °F (36.7 °C) (Oral)   Resp 14   Ht 1.676 m (5' 6\")   Wt

## 2024-11-12 NOTE — PROGRESS NOTES
Dannielle Bustillo was evaluated today and a DME order was entered for a nebulizer compressor in order to administer Albuterol and Ipratropium due to the diagnosis of asthma.  The need for this equipment and treatment was discussed with the patient and she understands and is in agreement.    Electronically signed by AYAZ PNIA MD on 11/12/2024 at 2:08 PM

## 2024-11-12 NOTE — CARE COORDINATION
Transitional planning-talked with patient. Plan is to go home with her . Has ride from daughter. Not wanting any home care at this time. Patient states she has a nebulizer at home already.

## 2024-11-12 NOTE — PROGRESS NOTES
Occupational Therapy  Occupational Therapy Initial Evaluation  Facility/Department: [facility] 5C STEPDOWN   Patient Name: Dannielle Bustillo        MRN: [id number]    : 1970    Date of Service: 2024    Discharge Recommendations   No therapy recommended at discharge.     OT Equipment Recommendations  Equipment Needed: Yes  Mobility Devices: ADL Assistive Devices  ADL Assistive Devices: Shower Chair without back, Grab Bars - shower    Chief Complaint   Patient presents with    Respiratory Distress     Pt arrives per EMS with c/o difficulty breathing. Per EMS, oxygen saturation @ 82% on RA, speaking in 2-3 word phrases. Pt placed on C-PAP per EMS, SpO2-99%. Pt given 125mg solumedrol IVP and 2Gm Mg en route to ED. Pt reports history of pneumonia, just finished po antibiotics yesterday.      Past Medical History:  has a past medical history of DDD (degenerative disc disease), cervical, DDD (degenerative disc disease), lumbar, Hypertension, Loss of perception for taste, Nasal congestion, Nasal polyps, and Spinal cord stimulator status.  Past Surgical History:  has a past surgical history that includes Cholecystectomy; back surgery; Tubal ligation; Tonsillectomy and adenoidectomy; [place] tooth extraction; Nasal polyp surgery; and Sinus endoscopy (N/A, 2024).    Assessment  Pt lying supine in bed upon entrance to room. Pt completed bed mobility with mod  independence. Pt sat at eob 20 minutes with good unsupported sitting balance. Sit to stand with independence. Pt completed dynamic mob in room with independence. Please refer to below assist levels for adl completion. Pt ed on OT POC, safety awareness tech, proper hand placement for transfers, and energy conservation tech with proper breathing tech with good return. Pt admits to pushing self at times when home and mouth breathing. Pt has good understanding of energy conservation tech and pursed lip breathing tech, pt reports needing to implement tech. Pt at max  spouse)  Bill Paying/Finance Responsibility: No (spouse completes)  Shopping Responsibility: Secondary (shared with spouse)  Ambulation Assistance: Independent  Transfer Assistance: Independent  Active : No  Mode of Transportation: Car  Occupation: Unemployed  Leisure & Hobbies: Babysit 4 gr children    Vision/Hearing  Vision  Vision: Impaired  Vision Exceptions: Wears glasses for reading (glasses present in the hospital)  Hearing  Hearing: Within functional limits    BUE Assessment  Gross Assessment  AROM: Within functional limits  PROM: Within functional limits  Strength: Within functional limits (BUE overall muscle strength 4/5 throughout)  Coordination: Within functional limits  Tone: Normal  Sensation: Intact (denies numbness/tingling B hands)  Hand Dominance: Right     Objective  Orientation  Overall Orientation Status: Within Functional Limits  Orientation Level: Oriented X4;Oriented to place;Oriented to time;Oriented to situation;Oriented to person  Cognition  Overall Cognitive Status: WFL    Activities of Daily Living  Feeding: Independent  Feeding Skilled Clinical Factors: drinking from cup with straw demonstrating hand to mouth patterning  Grooming: Independent  Grooming Skilled Clinical Factors: Pt washed B hands sink side in stance  UE Bathing: Based on clinical judgement;Independent  LE Bathing: Independent;Based on clinical judgement  UE Dressing: Independent;Setup  UE Dressing Skilled Clinical Factors: pt able to doff/don hosp gown, able to thread BUE's in and out of gown seated EOB with assist to tie gown in back  LE Dressing Skilled Clinical Factors: Pt adjusted B hosp socks seated EOB using figure four tech and hip flexion  Toileting: Independent    Balance  Balance  Sitting: Without support  Standing: Without support  Additional Comments  Additional Comments: Pt completed static and dynamic sitting balance unsupported ~ 20min for adl completion. pt completed static and dynamic standing ~

## 2024-11-12 NOTE — PLAN OF CARE
Problem: Discharge Planning  Goal: Discharge to home or other facility with appropriate resources  11/12/2024 1221 by Virgil De La Fuente RN  Outcome: Progressing  11/12/2024 0214 by Macrina Sevilla RN  Outcome: Progressing  Flowsheets (Taken 11/11/2024 2000)  Discharge to home or other facility with appropriate resources:   Identify barriers to discharge with patient and caregiver   Arrange for needed discharge resources and transportation as appropriate   Identify discharge learning needs (meds, wound care, etc)   Arrange for interpreters to assist at discharge as needed   Refer to discharge planning if patient needs post-hospital services based on physician order or complex needs related to functional status, cognitive ability or social support system     Problem: Safety - Adult  Goal: Free from fall injury  11/12/2024 1221 by Virgil De La Fuente RN  Outcome: Progressing  11/12/2024 0214 by Macrina Sevilla RN  Outcome: Progressing     Problem: Respiratory - Adult  Goal: Achieves optimal ventilation and oxygenation  11/12/2024 1221 by Virgil De La Fuente RN  Outcome: Progressing  11/12/2024 0807 by Sara Sosa RCP  Outcome: Progressing  Flowsheets (Taken 11/12/2024 0801)  Achieves optimal ventilation and oxygenation:   Assess for changes in respiratory status   Oxygen supplementation based on oxygen saturation or arterial blood gases   Assess and instruct to report shortness of breath or any respiratory difficulty   Respiratory therapy support as indicated  11/12/2024 0214 by Macrina Sevilla, RN  Outcome: Progressing  11/12/2024 0051 by Sandrita Reyna RCP  Outcome: Progressing  Flowsheets (Taken 11/12/2024 0051)  Achieves optimal ventilation and oxygenation:   Assess for changes in respiratory status   Assess for changes in mentation and behavior   Position to facilitate oxygenation and minimize respiratory effort   Oxygen supplementation based on oxygen saturation or arterial blood gases   Initiate smoking cessation

## 2024-11-12 NOTE — PLAN OF CARE
Problem: Discharge Planning  Goal: Discharge to home or other facility with appropriate resources  11/12/2024 0214 by Macrina Sevilla RN  Outcome: Progressing  Flowsheets (Taken 11/11/2024 2000)  Discharge to home or other facility with appropriate resources:   Identify barriers to discharge with patient and caregiver   Arrange for needed discharge resources and transportation as appropriate   Identify discharge learning needs (meds, wound care, etc)   Arrange for interpreters to assist at discharge as needed   Refer to discharge planning if patient needs post-hospital services based on physician order or complex needs related to functional status, cognitive ability or social support system     Problem: Safety - Adult  Goal: Free from fall injury  11/12/2024 0214 by Macrina Sevilla RN  Outcome: Progressing     Problem: Respiratory - Adult  Goal: Achieves optimal ventilation and oxygenation  11/12/2024 0214 by Macrina Sevilla RN  Outcome: Progressing     Problem: Pain  Goal: Verbalizes/displays adequate comfort level or baseline comfort level  11/12/2024 0214 by Macrina Sevilla RN  Outcome: Progressing     Problem: Skin/Tissue Integrity  Goal: Absence of new skin breakdown  Description: 1.  Monitor for areas of redness and/or skin breakdown  2.  Assess vascular access sites hourly  3.  Every 4-6 hours minimum:  Change oxygen saturation probe site  4.  Every 4-6 hours:  If on nasal continuous positive airway pressure, respiratory therapy assess nares and determine need for appliance change or resting period.  11/12/2024 0214 by Macrina Sevilla RN  Outcome: Progressing     Problem: ABCDS Injury Assessment  Goal: Absence of physical injury  11/12/2024 0214 by Macrina Sevilla RN  Outcome: Progressing

## 2024-11-12 NOTE — PLAN OF CARE
Problem: Respiratory - Adult  Goal: Achieves optimal ventilation and oxygenation  11/12/2024 0051 by Sandrita Reyna RCP  Outcome: Progressing  Flowsheets (Taken 11/12/2024 0051)  Achieves optimal ventilation and oxygenation:   Assess for changes in respiratory status   Assess for changes in mentation and behavior   Position to facilitate oxygenation and minimize respiratory effort   Oxygen supplementation based on oxygen saturation or arterial blood gases   Initiate smoking cessation protocol as indicated   Encourage broncho-pulmonary hygiene including cough, deep breathe, incentive spirometry   Assess the need for suctioning and aspirate as needed   Assess and instruct to report shortness of breath or any respiratory difficulty   Respiratory therapy support as indicated

## 2024-11-12 NOTE — PROGRESS NOTES
mg Oral Daily    Followed by    [START ON 11/20/2024] predniSONE  10 mg Oral Daily    budesonide-formoterol  2 puff Inhalation BID RT    montelukast  10 mg Oral Nightly    sodium chloride flush  5-40 mL IntraVENous 2 times per day    enoxaparin  30 mg SubCUTAneous BID    ipratropium 0.5 mg-albuterol 2.5 mg  1 Dose Inhalation Q6H WA RT       Continuous Infusions:   sodium chloride 75 mL/hr at 11/10/24 0240           REVIEW OF SYSTEMS:  CONSTITUTIONAL:  negative for  fevers, chills, sweats, fatigue, malaise, anorexia and weight loss  EYES:  negative for  double vision, blurred vision, dry eyes, eye discharge and redness  HEENT: post nasal drip , nasal congestion   RESPIRATORY:  See hpi  CARDIOVASCULAR:  negative for  chest pain, palpitations, orthopnea, PND  GASTROINTESTINAL:  negative for nausea, vomiting, change in bowel habits, diarrhea, constipation, abdominal pain, pruritus, abdominal mass and abdominal distention  GENITOURINARY:  negative for frequency, dysuria, nocturia, urinary incontinence and hesitancy  HEMATOLOGIC/LYMPHATIC:  negative for easy bruising, bleeding, lymphadenopathy, petechiae and swelling/edema  ALLERGIC/IMMUNOLOGIC:  negative for recurrent infections, urticaria and drug reactions  ENDOCRINE:  negative for heat intolerance, cold intolerance, tremor, weight changes and change in bowel habits  MUSCULOSKELETAL:  negative for  myalgias, arthralgias, pain, joint swelling, stiff joints and decreased range of motion  NEUROLOGICAL:  negative for headaches, dizziness, seizures, memory problems, speech problems, visual disturbance and coordination problems  BEHAVIOR/PSYCH:  negative for poor appetite, increased appetite, decreased sleep, increased sleep, decreased energy level, increased energy level and poor concentration   skin - no rash no dermatitis     Vitals:  /87   Pulse 79   Temp 98.8 °F (37.1 °C) (Oral)   Resp 18   Ht 1.676 m (5' 6\")   Wt 111.1 kg (245 lb)   SpO2 94%   BMI 39.54  found for: \"AMYLASE\"  Lipase: No results found for: \"LIPASE\"  Troponin: No results for input(s): \"TROPONINI\" in the last 72 hours.  BNP: No results for input(s): \"BNP\" in the last 72 hours.  Lipids: No results for input(s): \"CHOL\", \"HDL\" in the last 72 hours.    Invalid input(s): \"LDLCALCU\"  ABGs: No results found for: \"PHART\", \"PO2ART\", \"SFL0ZZK\"  INR:   Recent Labs     11/11/24  0558   INR 1.0     Thyroid: No results found for: \"T4\", \"TSH\"  Urinalysis: No results for input(s): \"BACTERIA\", \"BLOODU\", \"CLARITYU\", \"COLORU\", \"PHUR\", \"PROTEINU\", \"RBCUA\", \"SPECGRAV\", \"BILIRUBINUR\", \"NITRU\", \"WBCUA\", \"LEUKOCYTESUR\", \"GLUCOSEU\" in the last 72 hours.      Nuclear stress test with myocardial perfusion    Result Date: 11/11/2024  Exam is limited due to lack of prone stress imaging.  Mild reversible ischemia of the apical-inferior wall junction is difficult to exclude on the basis of this study. Left ventricular ejection fraction of 70%  Please see report for EKG portion of the examination which will be performed separately by physician from cardiology.  Risk stratification:  [Intermediate risk] Note:  Risk stratification incorporates both clinical history and test results.  Final risk determination is the responsibility of the ordering provider as history and other test results may increase or decrease the risk stratification reported for this examination.  Risk stratification criteria are adapted from \"Noninvasive Risk Stratification\" criteria from Garcia et.  Al, ACC/AATS/AHA/ASE/ASNC/SCAI/SCCT/STS 2017 Appropriate Use Criteria For Coronary Revascularization in Patients With Stable Ischemic Heart Disease  [facility] Volume 69, Issue 17, May 2017 High risk (>3% annual death or MI) 1.  Severe resting LV dysfunction (LVEF >35%) not readily explained by non coronary causes 2.  Resting perfusion abnormalities greater than 10% of the myocardium in patients without prior history or evidence of MI 3.  Stress-induced perfusion

## 2024-11-13 ENCOUNTER — TELEPHONE (OUTPATIENT)
Dept: PULMONOLOGY | Age: 54
End: 2024-11-13

## 2024-11-13 LAB
A ALTERNATA IGE QN: <0.1 KU/L (ref 0–0.34)
A FUMIGATUS IGE QN: <0.1 KU/L (ref 0–0.34)
ALLERGEN BIRCH IGE: <0.1 KU/L (ref 0–0.34)
ANION GAP SERPL CALCULATED.3IONS-SCNC: 8 MMOL/L (ref 9–16)
BASOPHILS # BLD: 0 K/UL (ref 0–0.2)
BASOPHILS NFR BLD: 0 % (ref 0–2)
BERMUDA GRASS IGE QN: <0.1 KU/L (ref 0–0.34)
BOXELDER IGE QN: <0.1 KU/L (ref 0–0.34)
BUN SERPL-MCNC: 27 MG/DL (ref 6–20)
C HERBARUM IGE QN: <0.1 KUL/L (ref 0–0.34)
CALCIUM SERPL-MCNC: 8.4 MG/DL (ref 8.6–10.4)
CALIF WALNUT POLN IGE QN: <0.1 KU/L (ref 0–0.34)
CAT DANDER IGE QN: <0.1 KU/L (ref 0–0.34)
CHLORIDE SERPL-SCNC: 111 MMOL/L (ref 98–107)
CMN PIGWEED IGE QN: <0.1 KU/L (ref 0–0.34)
CO2 SERPL-SCNC: 23 MMOL/L (ref 20–31)
COMMON RAGWEED IGE QN: <0.1 KU/L (ref 0–0.34)
COTTONWOOD IGE QN: <0.1 KU/L (ref 0–0.34)
CREAT SERPL-MCNC: 0.7 MG/DL (ref 0.6–0.9)
D FARINAE IGE QN: 0.14 KU/L (ref 0–0.34)
D PTERONYSS IGE QN: 0.12 KU/L (ref 0–0.34)
DOG DANDER IGE QN: <0.1 KU/L (ref 0–0.34)
EKG ATRIAL RATE: 96 BPM
EKG P AXIS: 48 DEGREES
EKG P-R INTERVAL: 160 MS
EKG Q-T INTERVAL: 352 MS
EKG QRS DURATION: 98 MS
EKG QTC CALCULATION (BAZETT): 444 MS
EKG R AXIS: 57 DEGREES
EKG T AXIS: 53 DEGREES
EKG VENTRICULAR RATE: 96 BPM
EOSINOPHIL # BLD: 0 K/UL (ref 0–0.4)
EOSINOPHILS RELATIVE PERCENT: 0 % (ref 1–4)
ERYTHROCYTE [DISTWIDTH] IN BLOOD BY AUTOMATED COUNT: 15.2 % (ref 11.8–14.4)
GFR, ESTIMATED: >90 ML/MIN/1.73M2
GLUCOSE SERPL-MCNC: 113 MG/DL (ref 74–99)
HCT VFR BLD AUTO: 40 % (ref 36.3–47.1)
HGB BLD-MCNC: 12.2 G/DL (ref 11.9–15.1)
IGE SERPL-ACNC: 42 IU/ML (ref 0–100)
IMM GRANULOCYTES # BLD AUTO: 0.15 K/UL (ref 0–0.3)
IMM GRANULOCYTES NFR BLD: 1 %
LONDON PLANE IGE QN: <0.1 KU/L (ref 0–0.34)
LYMPHOCYTES NFR BLD: 5.66 K/UL (ref 1–4.8)
LYMPHOCYTES RELATIVE PERCENT: 38 % (ref 24–44)
M RACEMOSUS IGE QN: <0.1 KU/L (ref 0–0.34)
MCH RBC QN AUTO: 29.1 PG (ref 25.2–33.5)
MCHC RBC AUTO-ENTMCNC: 30.5 G/DL (ref 28.4–34.8)
MCV RBC AUTO: 95.5 FL (ref 82.6–102.9)
MONOCYTES NFR BLD: 0.6 K/UL (ref 0.1–0.8)
MONOCYTES NFR BLD: 4 % (ref 1–7)
MORPHOLOGY: ABNORMAL
MOUSE EPITH IGE QN: <0.1 KU/L (ref 0–0.34)
MT JUNIPER IGE QN: <0.1 KU/L (ref 0–0.34)
NEUTROPHILS NFR BLD: 57 % (ref 36–66)
NEUTS SEG NFR BLD: 8.49 K/UL (ref 1.8–7.7)
NRBC BLD-RTO: 0 PER 100 WBC
P NOTATUM IGE QN: <0.1 KU/L (ref 0–0.34)
PECAN/HICK TREE IGE QN: <0.1 KU/L (ref 0–0.34)
PLATELET # BLD AUTO: 269 K/UL (ref 138–453)
PMV BLD AUTO: 10.5 FL (ref 8.1–13.5)
POTASSIUM SERPL-SCNC: 4.2 MMOL/L (ref 3.7–5.3)
RBC # BLD AUTO: 4.19 M/UL (ref 3.95–5.11)
ROACH IGE QN: <0.1 KU/L (ref 0–0.34)
SALTWORT IGE QN: <0.1 KU/L (ref 0–0.34)
SHEEP SORREL IGE QN: <0.1 KU/L (ref 0–0.34)
SODIUM SERPL-SCNC: 142 MMOL/L (ref 136–145)
TIMOTHY IGE QN: <0.1 KU/L (ref 0–0.34)
TROPONIN I SERPL HS-MCNC: <6 NG/L (ref 0–14)
WBC OTHER # BLD: 14.9 K/UL (ref 3.5–11.3)
WHITE ASH IGE QN: <0.1 KU/L (ref 0–0.34)
WHITE ELM IGE QN: <0.1 KU/L (ref 0–0.34)
WHITE MULBERRY IGE QN: <0.1 KU/L (ref 0–0.34)
WHITE OAK IGE QN: <0.1 KU/L (ref 0–0.34)

## 2024-11-13 PROCEDURE — 6370000000 HC RX 637 (ALT 250 FOR IP): Performed by: FAMILY MEDICINE

## 2024-11-13 PROCEDURE — 36415 COLL VENOUS BLD VENIPUNCTURE: CPT

## 2024-11-13 PROCEDURE — 2580000003 HC RX 258: Performed by: NURSE PRACTITIONER

## 2024-11-13 PROCEDURE — 6370000000 HC RX 637 (ALT 250 FOR IP): Performed by: INTERNAL MEDICINE

## 2024-11-13 PROCEDURE — 2060000000 HC ICU INTERMEDIATE R&B

## 2024-11-13 PROCEDURE — 84484 ASSAY OF TROPONIN QUANT: CPT

## 2024-11-13 PROCEDURE — 80048 BASIC METABOLIC PNL TOTAL CA: CPT

## 2024-11-13 PROCEDURE — 94640 AIRWAY INHALATION TREATMENT: CPT

## 2024-11-13 PROCEDURE — 93010 ELECTROCARDIOGRAM REPORT: CPT | Performed by: INTERNAL MEDICINE

## 2024-11-13 PROCEDURE — 6360000002 HC RX W HCPCS: Performed by: NURSE PRACTITIONER

## 2024-11-13 PROCEDURE — 6370000000 HC RX 637 (ALT 250 FOR IP): Performed by: STUDENT IN AN ORGANIZED HEALTH CARE EDUCATION/TRAINING PROGRAM

## 2024-11-13 PROCEDURE — 99232 SBSQ HOSP IP/OBS MODERATE 35: CPT

## 2024-11-13 PROCEDURE — 99232 SBSQ HOSP IP/OBS MODERATE 35: CPT | Performed by: STUDENT IN AN ORGANIZED HEALTH CARE EDUCATION/TRAINING PROGRAM

## 2024-11-13 PROCEDURE — 6370000000 HC RX 637 (ALT 250 FOR IP)

## 2024-11-13 PROCEDURE — 6370000000 HC RX 637 (ALT 250 FOR IP): Performed by: NURSE PRACTITIONER

## 2024-11-13 PROCEDURE — 93005 ELECTROCARDIOGRAM TRACING: CPT | Performed by: STUDENT IN AN ORGANIZED HEALTH CARE EDUCATION/TRAINING PROGRAM

## 2024-11-13 PROCEDURE — 94761 N-INVAS EAR/PLS OXIMETRY MLT: CPT

## 2024-11-13 PROCEDURE — 85025 COMPLETE CBC W/AUTO DIFF WBC: CPT

## 2024-11-13 RX ORDER — ISOSORBIDE MONONITRATE 30 MG/1
30 TABLET, EXTENDED RELEASE ORAL DAILY
Status: DISCONTINUED | OUTPATIENT
Start: 2024-11-13 | End: 2024-11-14 | Stop reason: HOSPADM

## 2024-11-13 RX ORDER — NITROGLYCERIN 0.4 MG/1
0.4 TABLET SUBLINGUAL EVERY 5 MIN PRN
Status: DISCONTINUED | OUTPATIENT
Start: 2024-11-13 | End: 2024-11-14 | Stop reason: HOSPADM

## 2024-11-13 RX ADMIN — NITROGLYCERIN 0.4 MG: 0.4 TABLET SUBLINGUAL at 10:17

## 2024-11-13 RX ADMIN — METHOCARBAMOL 750 MG: 750 TABLET ORAL at 05:48

## 2024-11-13 RX ADMIN — CLONAZEPAM 0.5 MG: 0.5 TABLET ORAL at 01:37

## 2024-11-13 RX ADMIN — ASPIRIN 81 MG: 81 TABLET, COATED ORAL at 08:53

## 2024-11-13 RX ADMIN — OXYCODONE 10 MG: 5 TABLET ORAL at 17:12

## 2024-11-13 RX ADMIN — IPRATROPIUM BROMIDE AND ALBUTEROL SULFATE 1 DOSE: .5; 2.5 SOLUTION RESPIRATORY (INHALATION) at 15:14

## 2024-11-13 RX ADMIN — ISOSORBIDE MONONITRATE 30 MG: 30 TABLET, EXTENDED RELEASE ORAL at 12:46

## 2024-11-13 RX ADMIN — METHOCARBAMOL 750 MG: 750 TABLET ORAL at 15:31

## 2024-11-13 RX ADMIN — MONTELUKAST 10 MG: 10 TABLET, FILM COATED ORAL at 20:25

## 2024-11-13 RX ADMIN — BUDESONIDE AND FORMOTEROL FUMARATE DIHYDRATE 2 PUFF: 80; 4.5 AEROSOL RESPIRATORY (INHALATION) at 20:23

## 2024-11-13 RX ADMIN — IPRATROPIUM BROMIDE AND ALBUTEROL SULFATE 1 DOSE: .5; 2.5 SOLUTION RESPIRATORY (INHALATION) at 08:44

## 2024-11-13 RX ADMIN — SODIUM CHLORIDE, PRESERVATIVE FREE 10 ML: 5 INJECTION INTRAVENOUS at 20:25

## 2024-11-13 RX ADMIN — BUDESONIDE AND FORMOTEROL FUMARATE DIHYDRATE 2 PUFF: 80; 4.5 AEROSOL RESPIRATORY (INHALATION) at 08:44

## 2024-11-13 RX ADMIN — SODIUM CHLORIDE, PRESERVATIVE FREE 10 ML: 5 INJECTION INTRAVENOUS at 08:55

## 2024-11-13 RX ADMIN — PREDNISONE 40 MG: 20 TABLET ORAL at 08:53

## 2024-11-13 RX ADMIN — PANTOPRAZOLE SODIUM 40 MG: 40 TABLET, DELAYED RELEASE ORAL at 05:48

## 2024-11-13 RX ADMIN — OXYCODONE 10 MG: 5 TABLET ORAL at 12:46

## 2024-11-13 RX ADMIN — ATORVASTATIN CALCIUM 20 MG: 20 TABLET, FILM COATED ORAL at 20:25

## 2024-11-13 RX ADMIN — CARVEDILOL 6.25 MG: 6.25 TABLET, FILM COATED ORAL at 08:53

## 2024-11-13 RX ADMIN — IPRATROPIUM BROMIDE AND ALBUTEROL SULFATE 1 DOSE: .5; 2.5 SOLUTION RESPIRATORY (INHALATION) at 20:23

## 2024-11-13 RX ADMIN — CLONAZEPAM 0.5 MG: 0.5 TABLET ORAL at 15:31

## 2024-11-13 RX ADMIN — OXYCODONE 10 MG: 5 TABLET ORAL at 01:34

## 2024-11-13 RX ADMIN — OXYCODONE 10 MG: 5 TABLET ORAL at 05:48

## 2024-11-13 RX ADMIN — OXYCODONE 10 MG: 5 TABLET ORAL at 22:37

## 2024-11-13 ASSESSMENT — PAIN DESCRIPTION - DESCRIPTORS
DESCRIPTORS: ACHING
DESCRIPTORS: ACHING

## 2024-11-13 ASSESSMENT — PAIN DESCRIPTION - LOCATION
LOCATION: BACK;HIP
LOCATION: CHEST
LOCATION: BACK

## 2024-11-13 ASSESSMENT — PAIN SCALES - GENERAL
PAINLEVEL_OUTOF10: 7
PAINLEVEL_OUTOF10: 4
PAINLEVEL_OUTOF10: 0
PAINLEVEL_OUTOF10: 8
PAINLEVEL_OUTOF10: 2
PAINLEVEL_OUTOF10: 8
PAINLEVEL_OUTOF10: 2
PAINLEVEL_OUTOF10: 7

## 2024-11-13 ASSESSMENT — PAIN SCALES - WONG BAKER: WONGBAKER_NUMERICALRESPONSE: NO HURT

## 2024-11-13 ASSESSMENT — PAIN DESCRIPTION - ORIENTATION: ORIENTATION: RIGHT;LEFT

## 2024-11-13 NOTE — TELEPHONE ENCOUNTER
----- Message from Jasmin MILIAN sent at 11/13/2024  7:43 AM EST -----  Sandra, please see message from Dr Churchill and call to schedule as requested. Thank you.  ----- Message -----  From: Christopher Churchill MD  Sent: 11/12/2024   1:41 PM EST  To: [facility] Respiratory Spec Clinical Staff    Please schedule follow-up with me in clinic in 4 - 6weeks with PFT    Electronically signed by CHRISTOPHER CHURCHILL MD on 11/12/2024 at 1:41 PM

## 2024-11-13 NOTE — PLAN OF CARE
Problem: Respiratory - Adult  Goal: Achieves optimal ventilation and oxygenation  11/13/2024 1525 by Heraclio Chapman, YUNG  Outcome: Progressing  Flowsheets (Taken 11/13/2024 1525)  Achieves optimal ventilation and oxygenation:   Assess for changes in respiratory status   Position to facilitate oxygenation and minimize respiratory effort   Assess the need for suctioning and aspirate as needed   Respiratory therapy support as indicated   Assess and instruct to report shortness of breath or any respiratory difficulty   Encourage broncho-pulmonary hygiene including cough, deep breathe, incentive spirometry   Oxygen supplementation based on oxygen saturation or arterial blood gases   Assess for changes in mentation and behavior

## 2024-11-13 NOTE — PLAN OF CARE
Problem: Discharge Planning  Goal: Discharge to home or other facility with appropriate resources  Outcome: Progressing     Problem: Safety - Adult  Goal: Free from fall injury  Outcome: Progressing     Problem: Respiratory - Adult  Goal: Achieves optimal ventilation and oxygenation  Outcome: Progressing  Flowsheets (Taken 11/12/2024 2000)  Achieves optimal ventilation and oxygenation:   Assess for changes in respiratory status   Assess for changes in mentation and behavior   Position to facilitate oxygenation and minimize respiratory effort   Oxygen supplementation based on oxygen saturation or arterial blood gases   Encourage broncho-pulmonary hygiene including cough, deep breathe, incentive spirometry   Initiate smoking cessation protocol as indicated   Assess and instruct to report shortness of breath or any respiratory difficulty   Respiratory therapy support as indicated     Problem: Pain  Goal: Verbalizes/displays adequate comfort level or baseline comfort level  Outcome: Progressing     Problem: Skin/Tissue Integrity  Goal: Absence of new skin breakdown  Description: 1.  Monitor for areas of redness and/or skin breakdown  2.  Assess vascular access sites hourly  3.  Every 4-6 hours minimum:  Change oxygen saturation probe site  4.  Every 4-6 hours:  If on nasal continuous positive airway pressure, respiratory therapy assess nares and determine need for appliance change or resting period.  Outcome: Progressing     Problem: ABCDS Injury Assessment  Goal: Absence of physical injury  Outcome: Progressing

## 2024-11-13 NOTE — PROGRESS NOTES
[facility]    [facility]   IN-PATIENT SERVICE   [facility]    Progress Note    11/13/2024    3:13 PM    Name:   Dannielle Bustillo  MRN:     [id number]     Acct:      [id number]   Room:   0541/0541-01   Day:  3  Admit Date:  11/9/2024  7:47 PM    PCP:   Gil Black MD  Code Status:  Full Code    Subjective:     C/C:   Chief Complaint   Patient presents with    Respiratory Distress     Pt arrives per EMS with c/o difficulty breathing. Per EMS, oxygen saturation @ 82% on RA, speaking in 2-3 word phrases. Pt placed on C-PAP per EMS, SpO2-99%. Pt given 125mg solumedrol IVP and 2Gm Mg en route to ED. Pt reports history of pneumonia, just finished po antibiotics yesterday.      Interval History Status: improved.     Seen at bedside, hemodynamically stable, blood pressure improved  Patient complaining of chest tightness this morning, started on as needed nitro, cardiology service informed, started on Imdur went in to check again in the afternoon, still has tightness which comes and goes, feeling short winded while walking back from the bathroom, also anxious    Brief History:     54-year-old female presented with shortness of breath, has known history of smoking, GERD, multiple allergies, recent nasal polypectomy, has been treated for bronchitis/pneumonia recently, patient states that she tried albuterol/nebulizer for her breathing difficulty however did not improve which is why she came to the ER, has been having difficulty lying flat as well, started on steroids for COPD exacerbation, seen by pulm, recommend doing PFT/sleep study as outpatient, found to have abnormal stress test, cardiology service consulted    Medications:     Allergies:  No Known Allergies    Current Meds:   Scheduled Meds:    isosorbide mononitrate  30 mg Oral Daily    pantoprazole  40 mg Oral QAM AC    aspirin  81 mg Oral Daily    atorvastatin  20 mg Oral Nightly    [START ON 11/14/2024] predniSONE  30 mg Oral

## 2024-11-13 NOTE — PROGRESS NOTES
Physical Therapy        Physical Therapy Cancel Note      DATE: 2024    NAME: Dannielle Bustillo  MRN: [id number]   : 1970      Patient not seen this date for Physical Therapy due to:    Patient independent with functional mobility. Will defer PT evaluation at this time. Please reorder PT if future needs arise. Spoke with pt and RN, pt reports no concerns with mobility at this time.  PT should be reordered upon change in medical status or upon pt request.        Electronically signed by Don Louise PT on 2024 at 4:17 PM

## 2024-11-14 VITALS
DIASTOLIC BLOOD PRESSURE: 78 MMHG | WEIGHT: 245 LBS | OXYGEN SATURATION: 98 % | BODY MASS INDEX: 39.37 KG/M2 | TEMPERATURE: 98 F | SYSTOLIC BLOOD PRESSURE: 130 MMHG | HEART RATE: 85 BPM | HEIGHT: 66 IN | RESPIRATION RATE: 15 BRPM

## 2024-11-14 LAB
ANION GAP SERPL CALCULATED.3IONS-SCNC: 9 MMOL/L (ref 9–16)
BASOPHILS # BLD: 0.03 K/UL (ref 0–0.2)
BASOPHILS NFR BLD: 0 % (ref 0–2)
BUN SERPL-MCNC: 19 MG/DL (ref 6–20)
CALCIUM SERPL-MCNC: 8.4 MG/DL (ref 8.6–10.4)
CHLORIDE SERPL-SCNC: 106 MMOL/L (ref 98–107)
CO2 SERPL-SCNC: 25 MMOL/L (ref 20–31)
CREAT SERPL-MCNC: 0.6 MG/DL (ref 0.6–0.9)
EKG ATRIAL RATE: 95 BPM
EKG P AXIS: 37 DEGREES
EKG P-R INTERVAL: 164 MS
EKG Q-T INTERVAL: 356 MS
EKG QRS DURATION: 100 MS
EKG QTC CALCULATION (BAZETT): 447 MS
EKG R AXIS: 54 DEGREES
EKG T AXIS: 52 DEGREES
EKG VENTRICULAR RATE: 95 BPM
EOSINOPHIL # BLD: <0.03 K/UL (ref 0–0.44)
EOSINOPHILS RELATIVE PERCENT: 0 % (ref 1–4)
ERYTHROCYTE [DISTWIDTH] IN BLOOD BY AUTOMATED COUNT: 14.7 % (ref 11.8–14.4)
GFR, ESTIMATED: >90 ML/MIN/1.73M2
GLUCOSE SERPL-MCNC: 117 MG/DL (ref 74–99)
HCT VFR BLD AUTO: 38.9 % (ref 36.3–47.1)
HGB BLD-MCNC: 11.8 G/DL (ref 11.9–15.1)
IMM GRANULOCYTES # BLD AUTO: 0.2 K/UL (ref 0–0.3)
IMM GRANULOCYTES NFR BLD: 1 %
LYMPHOCYTES NFR BLD: 4.8 K/UL (ref 1.1–3.7)
LYMPHOCYTES RELATIVE PERCENT: 33 % (ref 24–43)
MCH RBC QN AUTO: 29.1 PG (ref 25.2–33.5)
MCHC RBC AUTO-ENTMCNC: 30.3 G/DL (ref 28.4–34.8)
MCV RBC AUTO: 96 FL (ref 82.6–102.9)
MONOCYTES NFR BLD: 1.02 K/UL (ref 0.1–1.2)
MONOCYTES NFR BLD: 7 % (ref 3–12)
NEUTROPHILS NFR BLD: 59 % (ref 36–65)
NEUTS SEG NFR BLD: 8.39 K/UL (ref 1.5–8.1)
NRBC BLD-RTO: 0 PER 100 WBC
PLATELET # BLD AUTO: 267 K/UL (ref 138–453)
PMV BLD AUTO: 10.4 FL (ref 8.1–13.5)
POTASSIUM SERPL-SCNC: 3.9 MMOL/L (ref 3.7–5.3)
RBC # BLD AUTO: 4.05 M/UL (ref 3.95–5.11)
RBC # BLD: ABNORMAL 10*6/UL
SODIUM SERPL-SCNC: 140 MMOL/L (ref 136–145)
WBC OTHER # BLD: 14.4 K/UL (ref 3.5–11.3)

## 2024-11-14 PROCEDURE — 6370000000 HC RX 637 (ALT 250 FOR IP): Performed by: FAMILY MEDICINE

## 2024-11-14 PROCEDURE — 99239 HOSP IP/OBS DSCHRG MGMT >30: CPT | Performed by: STUDENT IN AN ORGANIZED HEALTH CARE EDUCATION/TRAINING PROGRAM

## 2024-11-14 PROCEDURE — 6370000000 HC RX 637 (ALT 250 FOR IP): Performed by: STUDENT IN AN ORGANIZED HEALTH CARE EDUCATION/TRAINING PROGRAM

## 2024-11-14 PROCEDURE — 6370000000 HC RX 637 (ALT 250 FOR IP)

## 2024-11-14 PROCEDURE — 85025 COMPLETE CBC W/AUTO DIFF WBC: CPT

## 2024-11-14 PROCEDURE — 6370000000 HC RX 637 (ALT 250 FOR IP): Performed by: INTERNAL MEDICINE

## 2024-11-14 PROCEDURE — 93005 ELECTROCARDIOGRAM TRACING: CPT | Performed by: STUDENT IN AN ORGANIZED HEALTH CARE EDUCATION/TRAINING PROGRAM

## 2024-11-14 PROCEDURE — 6370000000 HC RX 637 (ALT 250 FOR IP): Performed by: NURSE PRACTITIONER

## 2024-11-14 PROCEDURE — 93010 ELECTROCARDIOGRAM REPORT: CPT | Performed by: INTERNAL MEDICINE

## 2024-11-14 PROCEDURE — 94640 AIRWAY INHALATION TREATMENT: CPT

## 2024-11-14 PROCEDURE — 36415 COLL VENOUS BLD VENIPUNCTURE: CPT

## 2024-11-14 PROCEDURE — 2580000003 HC RX 258: Performed by: NURSE PRACTITIONER

## 2024-11-14 PROCEDURE — 80048 BASIC METABOLIC PNL TOTAL CA: CPT

## 2024-11-14 PROCEDURE — 94760 N-INVAS EAR/PLS OXIMETRY 1: CPT

## 2024-11-14 RX ORDER — PANTOPRAZOLE SODIUM 40 MG/1
40 TABLET, DELAYED RELEASE ORAL
Qty: 30 TABLET | Refills: 3 | Status: SHIPPED | OUTPATIENT
Start: 2024-11-15

## 2024-11-14 RX ORDER — ISOSORBIDE MONONITRATE 30 MG/1
30 TABLET, EXTENDED RELEASE ORAL DAILY
Qty: 30 TABLET | Refills: 3 | Status: SHIPPED | OUTPATIENT
Start: 2024-11-14

## 2024-11-14 RX ORDER — ASPIRIN 81 MG/1
81 TABLET ORAL DAILY
Qty: 30 TABLET | Refills: 3 | Status: SHIPPED | OUTPATIENT
Start: 2024-11-14

## 2024-11-14 RX ORDER — METOPROLOL TARTRATE 25 MG/1
25 TABLET, FILM COATED ORAL 2 TIMES DAILY
Qty: 20 TABLET | Refills: 0 | Status: SHIPPED | OUTPATIENT
Start: 2024-11-14 | End: 2024-11-24

## 2024-11-14 RX ORDER — ATORVASTATIN CALCIUM 20 MG/1
20 TABLET, FILM COATED ORAL NIGHTLY
Qty: 30 TABLET | Refills: 3 | Status: SHIPPED | OUTPATIENT
Start: 2024-11-14

## 2024-11-14 RX ORDER — NITROGLYCERIN 0.4 MG/1
0.4 TABLET SUBLINGUAL EVERY 5 MIN PRN
Qty: 25 TABLET | Refills: 3 | Status: SHIPPED | OUTPATIENT
Start: 2024-11-14

## 2024-11-14 RX ORDER — HYDROXYZINE HYDROCHLORIDE 25 MG/1
25 TABLET, FILM COATED ORAL 2 TIMES DAILY PRN
Qty: 14 TABLET | Refills: 0 | Status: SHIPPED | OUTPATIENT
Start: 2024-11-14 | End: 2024-11-21

## 2024-11-14 RX ORDER — PREDNISONE 10 MG/1
TABLET ORAL
Qty: 18 TABLET | Refills: 0 | Status: SHIPPED | OUTPATIENT
Start: 2024-11-14 | End: 2024-11-24

## 2024-11-14 RX ADMIN — BUDESONIDE AND FORMOTEROL FUMARATE DIHYDRATE 2 PUFF: 80; 4.5 AEROSOL RESPIRATORY (INHALATION) at 09:45

## 2024-11-14 RX ADMIN — PREDNISONE 30 MG: 10 TABLET ORAL at 09:10

## 2024-11-14 RX ADMIN — ASPIRIN 81 MG: 81 TABLET, COATED ORAL at 09:10

## 2024-11-14 RX ADMIN — PANTOPRAZOLE SODIUM 40 MG: 40 TABLET, DELAYED RELEASE ORAL at 05:40

## 2024-11-14 RX ADMIN — OXYCODONE 10 MG: 5 TABLET ORAL at 04:18

## 2024-11-14 RX ADMIN — OXYCODONE 10 MG: 5 TABLET ORAL at 09:08

## 2024-11-14 RX ADMIN — ALBUTEROL SULFATE 2 PUFF: 90 AEROSOL, METERED RESPIRATORY (INHALATION) at 05:45

## 2024-11-14 RX ADMIN — IPRATROPIUM BROMIDE AND ALBUTEROL SULFATE 1 DOSE: .5; 2.5 SOLUTION RESPIRATORY (INHALATION) at 09:45

## 2024-11-14 RX ADMIN — ISOSORBIDE MONONITRATE 30 MG: 30 TABLET, EXTENDED RELEASE ORAL at 09:10

## 2024-11-14 RX ADMIN — CLONAZEPAM 0.5 MG: 0.5 TABLET ORAL at 04:18

## 2024-11-14 RX ADMIN — SODIUM CHLORIDE, PRESERVATIVE FREE 10 ML: 5 INJECTION INTRAVENOUS at 09:10

## 2024-11-14 ASSESSMENT — PAIN SCALES - GENERAL
PAINLEVEL_OUTOF10: 6
PAINLEVEL_OUTOF10: 2
PAINLEVEL_OUTOF10: 8
PAINLEVEL_OUTOF10: 7

## 2024-11-14 ASSESSMENT — PAIN SCALES - WONG BAKER: WONGBAKER_NUMERICALRESPONSE: HURTS A LITTLE BIT

## 2024-11-14 ASSESSMENT — PAIN DESCRIPTION - LOCATION
LOCATION: BACK;HIP
LOCATION: BACK;HIP

## 2024-11-14 NOTE — DISCHARGE INSTR - DIET
Regular diet.     Good nutrition is important when healing from an illness, injury, or surgery.  Follow any nutrition recommendations given to you during your hospital stay.   If you were given an oral nutrition supplement while in the hospital, continue to take this supplement at home.  You can take it with meals, in-between meals, and/or before bedtime. These supplements can be purchased at most local grocery stores, pharmacies, and chain [employer]-stores.   If you have any questions about your diet or nutrition, call the hospital and ask for the dietitian.

## 2024-11-14 NOTE — DISCHARGE INSTRUCTIONS
Continue taking medications as prescribed, follow up with PCP, lung doctor, ENT doctor and cardiologist as given  Continue nasal rinses as told by ENT doctor,sips of water to clear  throat rather than coughing for postnasal drainage   Complete the PFT and sleep study before you go to lung doctor

## 2024-11-14 NOTE — DISCHARGE SUMMARY
[facility]@    [facility]   IN-PATIENT SERVICE   [facility]    Discharge Summary     Patient ID: Dannielle Bustillo  :  1970   MRN: [id number]     ACCOUNT:  [id number]   Patient's PCP: Gil Black MD  Admit Date: 2024   Discharge Date: 2024   Length of Stay: 4  Code Status:  Full Code  Admitting Physician: No admitting provider for patient encounter.  Discharge Physician: Chon Graham Sra, MD     Active Discharge Diagnoses:     Hospital Problem Lists:  Principal Problem:    Acute respiratory failure with hypoxia  Active Problems:    Tachycardia    Obesity (BMI 35.0-39.9 without comorbidity)    Tobacco use disorder, moderate, in early remission    Dyspnea on exertion    Triad asthma    Nasal polyposis    Sleep-related breathing disorder    Abnormal stress test    Essential hypertension    Shortness of breath  Resolved Problems:    DDD (degenerative disc disease), lumbar      Admission Condition:  stable     Discharged Condition: stable    Hospital Stay:     Hospital Course:  54-year-old female presented with shortness of breath, has known history of smoking, GERD, multiple allergies, recent nasal polypectomy, has been treated for bronchitis/pneumonia recently, patient states that she tried albuterol/nebulizer for her breathing difficulty however did not improve which is why she came to the ER, has been having difficulty lying flat as well, started on steroids , seen by pulm, recommend doing PFT/sleep study as outpatient, found to have abnormal stress test, cardiology service consulted , recommended medical management with aspirin, Lipitor, beta-blocker, Imdur, echo reviewed was unremarkable, patient was counseled against smoking, recommended follow-up with pulm/ENT/cardiology/PCP as outpatient, currently stable for discharge          Significant therapeutic interventions: As described above    Significant Diagnostic Studies:   Labs / Micro:  CBC:   Lab

## 2024-11-15 LAB
MICROORGANISM SPEC CULT: NORMAL
MICROORGANISM SPEC CULT: NORMAL
SERVICE CMNT-IMP: NORMAL
SERVICE CMNT-IMP: NORMAL
SPECIMEN DESCRIPTION: NORMAL
SPECIMEN DESCRIPTION: NORMAL

## 2024-11-26 ENCOUNTER — HOSPITAL ENCOUNTER (OUTPATIENT)
Dept: PREADMISSION TESTING | Age: 54
Discharge: HOME | End: 2024-11-30

## 2024-11-26 VITALS — HEIGHT: 66 IN | WEIGHT: 275 LBS | BODY MASS INDEX: 44.2 KG/M2

## 2024-12-06 NOTE — PRE-PROCEDURE INSTRUCTIONS
Have you received your Prep? Any questions with prep instructions?Y  Only Clear Liquid Diet day before.Y  Nothing to eat after midnight day before procedure.Y  Are you taking any blood thinners? If so, you need to Stop.N  Remove any jewelry and body piercings.Y  Do you wear glasses? If so, please bring a case to store them in.  Are you having any Covid symptoms?N  Do you have any new rashes, infections, etc. that we should be aware of?N  Do you have a ride home the day of surgery? It cannot be a cab or medical transportation.Y  Verify surgery time/date and what time to arrive at hospital.1954

## 2024-12-09 ENCOUNTER — ANESTHESIA EVENT (OUTPATIENT)
Dept: OPERATING ROOM | Age: 54
End: 2024-12-09
Payer: COMMERCIAL

## 2024-12-10 ENCOUNTER — HOSPITAL ENCOUNTER (OUTPATIENT)
Age: 54
Setting detail: OUTPATIENT SURGERY
Discharge: HOME | End: 2024-12-10
Attending: SURGERY | Admitting: SURGERY
Payer: COMMERCIAL

## 2024-12-10 ENCOUNTER — ANESTHESIA (OUTPATIENT)
Dept: OPERATING ROOM | Age: 54
End: 2024-12-10
Payer: COMMERCIAL

## 2024-12-10 VITALS
HEIGHT: 66 IN | TEMPERATURE: 97.7 F | WEIGHT: 275 LBS | BODY MASS INDEX: 44.2 KG/M2 | HEART RATE: 90 BPM | DIASTOLIC BLOOD PRESSURE: 88 MMHG | RESPIRATION RATE: 16 BRPM | SYSTOLIC BLOOD PRESSURE: 138 MMHG | OXYGEN SATURATION: 96 %

## 2024-12-10 DIAGNOSIS — Z12.12 ENCOUNTER FOR SCREENING FOR COLORECTAL MALIGNANT NEOPLASM: ICD-10-CM

## 2024-12-10 DIAGNOSIS — Z12.11 ENCOUNTER FOR SCREENING FOR COLORECTAL MALIGNANT NEOPLASM: ICD-10-CM

## 2024-12-10 LAB — HCG, PREGNANCY URINE (POC): NEGATIVE

## 2024-12-10 PROCEDURE — 7100000030 HC ASPR PHASE II RECOVERY - FIRST 15 MIN: Performed by: SURGERY

## 2024-12-10 PROCEDURE — 45385 COLONOSCOPY W/LESION REMOVAL: CPT | Performed by: SURGERY

## 2024-12-10 PROCEDURE — 3700000000 HC ANESTHESIA ATTENDED CARE: Performed by: SURGERY

## 2024-12-10 PROCEDURE — 2709999900 HC NON-CHARGEABLE SUPPLY: Performed by: SURGERY

## 2024-12-10 PROCEDURE — 3609010600 HC COLONOSCOPY POLYPECTOMY SNARE/COLD BIOPSY: Performed by: SURGERY

## 2024-12-10 PROCEDURE — 7100000000 HC PACU RECOVERY - FIRST 15 MIN: Performed by: SURGERY

## 2024-12-10 PROCEDURE — 2580000003 HC RX 258: Performed by: ANESTHESIOLOGY

## 2024-12-10 PROCEDURE — 7100000001 HC PACU RECOVERY - ADDTL 15 MIN: Performed by: SURGERY

## 2024-12-10 PROCEDURE — 7100000011 HC PHASE II RECOVERY - ADDTL 15 MIN: Performed by: SURGERY

## 2024-12-10 PROCEDURE — 88305 TISSUE EXAM BY PATHOLOGIST: CPT

## 2024-12-10 PROCEDURE — 3700000001 HC ADD 15 MINUTES (ANESTHESIA): Performed by: SURGERY

## 2024-12-10 PROCEDURE — 2500000003 HC RX 250 WO HCPCS: Performed by: NURSE ANESTHETIST, CERTIFIED REGISTERED

## 2024-12-10 PROCEDURE — 7100000010 HC PHASE II RECOVERY - FIRST 15 MIN: Performed by: SURGERY

## 2024-12-10 PROCEDURE — 7100000031 HC ASPR PHASE II RECOVERY - ADDTL 15 MIN: Performed by: SURGERY

## 2024-12-10 PROCEDURE — 6360000002 HC RX W HCPCS: Performed by: NURSE ANESTHETIST, CERTIFIED REGISTERED

## 2024-12-10 PROCEDURE — 81025 URINE PREGNANCY TEST: CPT

## 2024-12-10 RX ORDER — MIDAZOLAM HYDROCHLORIDE 1 MG/ML
INJECTION, SOLUTION INTRAMUSCULAR; INTRAVENOUS
Status: DISCONTINUED | OUTPATIENT
Start: 2024-12-10 | End: 2024-12-10 | Stop reason: SDUPTHER

## 2024-12-10 RX ORDER — LIDOCAINE HYDROCHLORIDE 10 MG/ML
1 INJECTION, SOLUTION EPIDURAL; INFILTRATION; INTRACAUDAL; PERINEURAL
Status: DISCONTINUED | OUTPATIENT
Start: 2024-12-10 | End: 2024-12-10 | Stop reason: HOSPADM

## 2024-12-10 RX ORDER — METOPROLOL TARTRATE 25 MG/1
25 TABLET, FILM COATED ORAL 2 TIMES DAILY
COMMUNITY

## 2024-12-10 RX ORDER — PROPOFOL 10 MG/ML
INJECTION, EMULSION INTRAVENOUS
Status: DISCONTINUED | OUTPATIENT
Start: 2024-12-10 | End: 2024-12-10 | Stop reason: SDUPTHER

## 2024-12-10 RX ORDER — ROCURONIUM BROMIDE 10 MG/ML
INJECTION, SOLUTION INTRAVENOUS
Status: DISCONTINUED | OUTPATIENT
Start: 2024-12-10 | End: 2024-12-10 | Stop reason: SDUPTHER

## 2024-12-10 RX ORDER — SODIUM CHLORIDE 0.9 % (FLUSH) 0.9 %
5-40 SYRINGE (ML) INJECTION PRN
Status: DISCONTINUED | OUTPATIENT
Start: 2024-12-10 | End: 2024-12-10 | Stop reason: HOSPADM

## 2024-12-10 RX ORDER — SODIUM CHLORIDE 9 MG/ML
INJECTION, SOLUTION INTRAVENOUS CONTINUOUS
Status: DISCONTINUED | OUTPATIENT
Start: 2024-12-10 | End: 2024-12-10 | Stop reason: HOSPADM

## 2024-12-10 RX ORDER — LIDOCAINE HYDROCHLORIDE 20 MG/ML
INJECTION, SOLUTION EPIDURAL; INFILTRATION; INTRACAUDAL; PERINEURAL
Status: DISCONTINUED | OUTPATIENT
Start: 2024-12-10 | End: 2024-12-10 | Stop reason: SDUPTHER

## 2024-12-10 RX ORDER — SODIUM CHLORIDE 9 MG/ML
INJECTION, SOLUTION INTRAVENOUS PRN
Status: DISCONTINUED | OUTPATIENT
Start: 2024-12-10 | End: 2024-12-10 | Stop reason: HOSPADM

## 2024-12-10 RX ORDER — SUCCINYLCHOLINE/SOD CL,ISO/PF 200MG/10ML
SYRINGE (ML) INTRAVENOUS
Status: DISCONTINUED | OUTPATIENT
Start: 2024-12-10 | End: 2024-12-10 | Stop reason: SDUPTHER

## 2024-12-10 RX ORDER — SODIUM CHLORIDE 0.9 % (FLUSH) 0.9 %
5-40 SYRINGE (ML) INJECTION EVERY 12 HOURS SCHEDULED
Status: DISCONTINUED | OUTPATIENT
Start: 2024-12-10 | End: 2024-12-10 | Stop reason: HOSPADM

## 2024-12-10 RX ORDER — ONDANSETRON 2 MG/ML
INJECTION INTRAMUSCULAR; INTRAVENOUS
Status: DISCONTINUED | OUTPATIENT
Start: 2024-12-10 | End: 2024-12-10 | Stop reason: SDUPTHER

## 2024-12-10 RX ADMIN — SUGAMMADEX 250 MG: 100 INJECTION, SOLUTION INTRAVENOUS at 09:47

## 2024-12-10 RX ADMIN — MIDAZOLAM 2 MG: 1 INJECTION INTRAMUSCULAR; INTRAVENOUS at 08:40

## 2024-12-10 RX ADMIN — ROCURONIUM BROMIDE 15 MG: 10 INJECTION, SOLUTION INTRAVENOUS at 09:08

## 2024-12-10 RX ADMIN — ROCURONIUM BROMIDE 10 MG: 10 INJECTION, SOLUTION INTRAVENOUS at 08:49

## 2024-12-10 RX ADMIN — Medication 140 MG: at 08:49

## 2024-12-10 RX ADMIN — SODIUM CHLORIDE: 9 INJECTION, SOLUTION INTRAVENOUS at 08:27

## 2024-12-10 RX ADMIN — ONDANSETRON 4 MG: 2 INJECTION, SOLUTION INTRAMUSCULAR; INTRAVENOUS at 09:02

## 2024-12-10 RX ADMIN — PROPOFOL 200 MG: 10 INJECTION, EMULSION INTRAVENOUS at 08:49

## 2024-12-10 RX ADMIN — ROCURONIUM BROMIDE 40 MG: 10 INJECTION, SOLUTION INTRAVENOUS at 08:55

## 2024-12-10 RX ADMIN — SODIUM CHLORIDE: 9 INJECTION, SOLUTION INTRAVENOUS at 08:30

## 2024-12-10 RX ADMIN — LIDOCAINE HYDROCHLORIDE 100 MG: 20 INJECTION, SOLUTION EPIDURAL; INFILTRATION; INTRACAUDAL; PERINEURAL at 08:49

## 2024-12-10 ASSESSMENT — ENCOUNTER SYMPTOMS
SHORTNESS OF BREATH: 0
NAUSEA: 0
SINUS PRESSURE: 0
ABDOMINAL PAIN: 0
SHORTNESS OF BREATH: 1

## 2024-12-10 ASSESSMENT — PAIN - FUNCTIONAL ASSESSMENT
PAIN_FUNCTIONAL_ASSESSMENT: NONE - DENIES PAIN
PAIN_FUNCTIONAL_ASSESSMENT: 0-10
PAIN_FUNCTIONAL_ASSESSMENT: NONE - DENIES PAIN

## 2024-12-10 ASSESSMENT — PAIN DESCRIPTION - DESCRIPTORS: DESCRIPTORS: ACHING

## 2024-12-10 NOTE — OP NOTE
[facility]   Maury Ozuna MD, FACS  Kate Nazario, APRN-CNP  [address], [address]  [place] [address]  P: [phone], F: [phone]    PROCEDURE NOTE    DATE OF PROCEDURE: 12/10/2024    SURGEON: Maury Ozuna MD    ASSISTANT: None    PREOPERATIVE DIAGNOSIS: Screening colonoscopy    POSTOPERATIVE DIAGNOSIS: Very redundant colon.  Low rectal polyp.  Prominent internal/external hemorrhoids    OPERATION: Total colonoscopy to cecum with intubation of terminal ileum.  Low rectal polypectomy with hot snare polypectomy technique    ANESTHESIA: General    ESTIMATED BLOOD LOSS: None    COMPLICATIONS: None     SPECIMENS:  Was Obtained: Low rectal polyp    HISTORY: The patient is a 54 y.o. year old female with history of above preop diagnosis.  I recommended colonoscopy with possible biopsy or polypectomy and I explained the risk, benefits, expected outcome, and alternatives to the procedure.  Risks included but are not limited to bleeding, infection, respiratory distress, hypotension, and perforation of the colon and possibility of missing a lesion.  The patient understands and is in agreement.      PROCEDURE: The patient was given IV conscious sedation.  The patient's SPO2 remained above 90% throughout the procedure. Digital rectal exam was normal.  The colonoscope was inserted through the anus into the rectum and advanced under direct vision to the cecum with difficulty.  Patient has a very redundant colon.  Terminal ileum was examined for approximately 2 inches.  The prep was good.      Findings:  Terminal ileum: normal    Cecum/Ascending colon: normal    Transverse colon: normal    Descending/Sigmoid colon: Grossly normal but very redundant sigmoid    Rectum/Anus: examined in normal and retroflexed positions and was abnormal: Prominent internal/external hemorrhoids.  Low rectal polyp seen upon retroflexion removed and retrieved with hot snare polypectomy technique and sent

## 2024-12-10 NOTE — ANESTHESIA PRE PROCEDURE
Department of Anesthesiology  Preprocedure Note       Name:  Dannielle Bustillo   Age:  54 y.o.  :  1970                                          MRN:  [id number]         Date:  12/10/2024      Surgeon: Surgeon(s):  Maury Ozuna MD    Procedure: Procedure(s):  COLORECTAL CANCER SCREENING, NOT HIGH RISK    Medications prior to admission:   Prior to Admission medications    Medication Sig Start Date End Date Taking? Authorizing Provider   isosorbide mononitrate (IMDUR) 30 MG extended release tablet Take 1 tablet by mouth daily 24  Yes Chon Mart MD   nitroGLYCERIN (NITROSTAT) 0.4 MG SL tablet Place 1 tablet under the tongue every 5 minutes as needed for Chest pain up to max of 3 total doses. If no relief after 1 dose, call 911. 24  Yes Chon Mart MD   atorvastatin (LIPITOR) 20 MG tablet Take 1 tablet by mouth nightly 24  Yes Chon Mart MD   pantoprazole (PROTONIX) 40 MG tablet Take 1 tablet by mouth every morning (before breakfast) 11/15/24  Yes Chon Mart MD   albuterol sulfate HFA (PROVENTIL;VENTOLIN;PROAIR) 108 (90 Base) MCG/ACT inhaler Inhale 2 puffs into the lungs every 6 hours as needed for Wheezing 24  Yes Christopher Churchill MD   budesonide-formoterol (SYMBICORT) 80-4.5 MCG/ACT AERO Inhale 2 puffs into the lungs in the morning and 2 puffs in the evening. Rinse your mouth and gargle throat with water and spit after inhaler use. 24  Yes Christopher Churchill MD   ipratropium 0.5 mg-albuterol 2.5 mg (DUONEB) 0.5-2.5 (3) MG/3ML SOLN nebulizer solution Inhale 3 mLs into the lungs every 4 hours as needed for Shortness of Breath or Wheezing 24  Yes Christopher Churchill MD   montelukast (SINGULAIR) 10 MG tablet Take 1 tablet by mouth nightly 24  Yes Christopher Churchill MD   methocarbamol (ROBAXIN) 750 MG tablet Take 1 tablet by mouth 3 times daily   Yes Provider, MD Cedrick   oxyCODONE HCl (OXY-IR) 10 MG immediate release tablet Take 1 tablet

## 2024-12-10 NOTE — ANESTHESIA POSTPROCEDURE EVALUATION
Department of Anesthesiology  Postprocedure Note    Patient: Dannielle Bustillo  MRN: [id number]  YOB: 1970  Date of evaluation: 12/10/2024    Procedure Summary       Date: 12/10/24 Room / Location: [address]    Anesthesia Start: 0844 Anesthesia Stop: 0957    Procedure: COLONOSCOPY POLYPECTOMY SNARE/BIOPSY Diagnosis:       Encounter for screening for colorectal malignant neoplasm      (Encounter for screening for colorectal malignant neoplasm [Z12.11, Z12.12])    Surgeons: Maury Ozuna MD Responsible Provider: Sara Tian MD    Anesthesia Type: general ASA Status: 3            Anesthesia Type: No value filed.    Aminah Phase I: Aminah Score: 10    Aminah Phase II: Aminah Score: 10    Anesthesia Post Evaluation    Comments: POST- ANESTHESIA EVALUATION       Pt Name: Dannielle Bustillo  MRN: [id number]  YOB: 1970  Date of evaluation: 12/10/2024  Time:  11:15 AM      /88   Pulse 90   Temp 97.7 °F (36.5 °C) (Infrared)   Resp 16   Ht 1.676 m (5' 6\")   Wt 124.7 kg (275 lb)   SpO2 96%   BMI 44.39 kg/m²      Consciousness Level  Awake  Cardiopulmonary Status  Stable  Pain Adequately Treated YES  Nausea / Vomiting  NO  Adequate Hydration  YES  Anesthesia Related Complications NONE     P/S - Patient seen in phase 11 recovery - denied any CP/SOB/DOTTIE. Vitals stable. Advised to follow up with cardiology as planned and to return to the ER if she developed any of the above symptoms.     Electronically signed by Sara Tian MD on 12/10/2024 at 11:15 AM           No notable events documented.

## 2024-12-10 NOTE — H&P
HISTORY and PHYSICAL  [facility]       NAME:  Dannielle Bustillo  MRN: [id number]   YOB: 1970   Date: 12/10/2024   Age: 54 y.o.  Gender: female       COMPLAINT AND PRESENT HISTORY:     Dannielle Bustillo is 54 y.o.,   female, having a Screening Colonoscopy.      Patient is being seen for Pre-Op Diagnosis Codes:      * Encounter for screening for colorectal malignant neoplasm     Prior  Colonoscopy was done approx 20 yrs ago.      Pt denies abdominal pain including bloating/gas.    No changes in bowel habits.  No diarrhea, constipation, blood in stools, black tarry stools.      No changes in appetite and unintended weight loss. Denies any nausea or vomiting. No  heartburn, indigestion or acid reflux.   Pt is taking  OTC antacid.   Denies Family Hx of colon cancer.    Medical history reviewed: DDD has implanted spinal cord stimulator.  Pt states she has a remote and is turned off.    Patient voices feeling well today. Denies any recent fever or chills, chest pain/pressure/palpitations.       Pt reports  SOB.     Patient has been NPO since midnight. No blood thinners in the past  5-7 days. (Aspirin)    Pt took  Imdur this am.    Patient states  has completed and followed prescribed prep with clear outcome.      Patient denies any personal or family problems with anesthesia     RECENT LABS, IMAGING AND TESTING     Lab Results   Component Value Date    WBC 14.4 (H) 11/14/2024    RBC 4.05 11/14/2024    HGB 11.8 (L) 11/14/2024    HCT 38.9 11/14/2024    MCV 96.0 11/14/2024    MCH 29.1 11/14/2024    MCHC 30.3 11/14/2024    RDW 14.7 (H) 11/14/2024     11/14/2024    MPV 10.4 11/14/2024        Lab Results   Component Value Date     11/14/2024    K 3.9 11/14/2024     11/14/2024    CO2 25 11/14/2024    BUN 19 11/14/2024    CREATININE 0.6 11/14/2024    GLUCOSE 117 (H) 11/14/2024    CALCIUM 8.4 (L) 11/14/2024    BILITOT 0.2 11/10/2024    ALKPHOS 96 11/10/2024    AST 31 11/10/2024    ALT

## 2024-12-10 NOTE — PROGRESS NOTES
Urine pregnancy test negative  
weeks, will consider cardiac cath at that time.  No objection to dc home from cardiology standpoint       Discussed with patient and nursing.         Smita Haley, APRN - CNP        [facility] [place]

## 2024-12-12 LAB — SURGICAL PATHOLOGY REPORT: NORMAL

## 2024-12-16 ENCOUNTER — TELEPHONE (OUTPATIENT)
Dept: PULMONOLOGY | Age: 54
End: 2024-12-16

## 2024-12-16 ENCOUNTER — OFFICE VISIT (OUTPATIENT)
Dept: PULMONOLOGY | Age: 54
End: 2024-12-16

## 2024-12-16 VITALS
OXYGEN SATURATION: 94 % | RESPIRATION RATE: 18 BRPM | DIASTOLIC BLOOD PRESSURE: 80 MMHG | HEART RATE: 106 BPM | SYSTOLIC BLOOD PRESSURE: 140 MMHG | BODY MASS INDEX: 44.03 KG/M2 | WEIGHT: 274 LBS | HEIGHT: 66 IN

## 2024-12-16 DIAGNOSIS — Z88.6 TRIAD ASTHMA: Primary | ICD-10-CM

## 2024-12-16 DIAGNOSIS — R09.82 POST-NASAL DRIP: ICD-10-CM

## 2024-12-16 DIAGNOSIS — J33.9 TRIAD ASTHMA: Primary | ICD-10-CM

## 2024-12-16 DIAGNOSIS — G47.30 SLEEP-RELATED BREATHING DISORDER: ICD-10-CM

## 2024-12-16 DIAGNOSIS — J45.909 TRIAD ASTHMA: Primary | ICD-10-CM

## 2024-12-16 DIAGNOSIS — J33.9 NASAL POLYPOSIS: ICD-10-CM

## 2024-12-16 LAB
DLCO %PRED: NORMAL
DLCO PRED: NORMAL
DLCO/VA %PRED: NORMAL
DLCO/VA PRED: NORMAL
DLCO/VA: NORMAL
DLCO: NORMAL
EXPIRATORY TIME-POST: NORMAL
EXPIRATORY TIME: NORMAL
FEF 25-75 %CHNG: NORMAL
FEF 25-75 POST %PRED: NORMAL
FEF 25-75% %PRED-PRE: NORMAL
FEF 25-75% PRED: NORMAL
FEF 25-75-POST: NORMAL
FEF 25-75-PRE: NORMAL
FEV1 %PRED-POST: NORMAL
FEV1 %PRED-PRE: NORMAL
FEV1 PRED: NORMAL
FEV1-POST: NORMAL
FEV1-PRE: NORMAL
FEV1/FVC %PRED-POST: NORMAL
FEV1/FVC %PRED-PRE: NORMAL
FEV1/FVC PRED: NORMAL
FEV1/FVC-POST: NORMAL
FEV1/FVC-PRE: NORMAL
FVC %PRED-POST: NORMAL
FVC %PRED-PRE: NORMAL
FVC PRED: NORMAL
FVC-POST: NORMAL
FVC-PRE: NORMAL
GAW %PRED: NORMAL
GAW PRED: NORMAL
GAW: NORMAL
IC PRE %PRED: NORMAL
IC PRED: NORMAL
IC: NORMAL
MEP: NORMAL
MIP: NORMAL
MVV %PRED-PRE: NORMAL
MVV PRED: NORMAL
MVV-PRE: NORMAL
PEF %PRED-POST: NORMAL
PEF %PRED-PRE: NORMAL
PEF PRED: NORMAL
PEF%CHNG: NORMAL
PEF-POST: NORMAL
PEF-PRE: NORMAL
RAW %PRED: NORMAL
RAW PRED: NORMAL
RAW: NORMAL
RV PRE %PRED: NORMAL
RV PRED: NORMAL
RV: NORMAL
SVC %PRED: NORMAL
SVC PRED: NORMAL
SVC: NORMAL
TLC PRE %PRED: NORMAL
TLC PRED: NORMAL
TLC: NORMAL
VA %PRED: NORMAL
VA PRED: NORMAL
VA: NORMAL
VTG %PRED: NORMAL
VTG PRED: NORMAL
VTG: NORMAL

## 2024-12-16 NOTE — TELEPHONE ENCOUNTER
Patient and her daughter contacted the office to notify Dr. Churchill of which medications that the patient has.     Per the patients daughter, the patient has the Duoneb nebulizer solution as ordered by Dr. Churchill. Patient also has a prescription for Albuterol Sulfate that was ordered by her family doctor. Please advise which medication the patient should be using.

## 2024-12-16 NOTE — PATIENT INSTRUCTIONS
Patient to call with the name of her nebulizer solution. Dr Churchill will advise how she should be taking it once he knows the name. -BABAK

## 2024-12-16 NOTE — TELEPHONE ENCOUNTER
Writer contacted patients daughter and notified her that per Dr. Churchill, the patient should discontinue the Albuterol Sulfate and use Duoneb twice daily for 1 week followed by as needed every 6 hours.

## 2024-12-17 ENCOUNTER — OFFICE VISIT (OUTPATIENT)
Age: 54
End: 2024-12-17
Payer: COMMERCIAL

## 2024-12-17 VITALS
DIASTOLIC BLOOD PRESSURE: 86 MMHG | WEIGHT: 276.8 LBS | BODY MASS INDEX: 44.68 KG/M2 | OXYGEN SATURATION: 98 % | HEART RATE: 85 BPM | SYSTOLIC BLOOD PRESSURE: 143 MMHG

## 2024-12-17 DIAGNOSIS — I10 ESSENTIAL HYPERTENSION: Primary | ICD-10-CM

## 2024-12-17 DIAGNOSIS — E66.01 CLASS 3 SEVERE OBESITY DUE TO EXCESS CALORIES WITH BODY MASS INDEX (BMI) OF 40.0 TO 44.9 IN ADULT, UNSPECIFIED WHETHER SERIOUS COMORBIDITY PRESENT: ICD-10-CM

## 2024-12-17 DIAGNOSIS — F17.201 TOBACCO USE DISORDER, MODERATE, IN EARLY REMISSION: ICD-10-CM

## 2024-12-17 DIAGNOSIS — J45.909 ASTHMA, UNSPECIFIED ASTHMA SEVERITY, UNSPECIFIED WHETHER COMPLICATED, UNSPECIFIED WHETHER PERSISTENT: ICD-10-CM

## 2024-12-17 DIAGNOSIS — E66.813 CLASS 3 SEVERE OBESITY DUE TO EXCESS CALORIES WITH BODY MASS INDEX (BMI) OF 40.0 TO 44.9 IN ADULT, UNSPECIFIED WHETHER SERIOUS COMORBIDITY PRESENT: ICD-10-CM

## 2024-12-17 PROCEDURE — 99214 OFFICE O/P EST MOD 30 MIN: CPT

## 2024-12-17 PROCEDURE — 93000 ELECTROCARDIOGRAM COMPLETE: CPT

## 2024-12-17 PROCEDURE — 3079F DIAST BP 80-89 MM HG: CPT

## 2024-12-17 PROCEDURE — 3077F SYST BP >= 140 MM HG: CPT

## 2024-12-17 ASSESSMENT — ENCOUNTER SYMPTOMS: CHEST TIGHTNESS: 1

## 2024-12-17 NOTE — PROGRESS NOTES
tolerated. Patient's medications and side effects were discussed. Medication refills were provided if needed. Follow up appointment timing was discussed. All questions and concerns were addressed to patient's satisfaction.     Return in about 6 months (around 6/17/2025).    Smita Haley, SHAY - [facility]

## 2024-12-18 NOTE — PROGRESS NOTES
Patient had difficulty performing PFT  
file.    SURGICAL HISTORY:   Past Surgical History:   Procedure Laterality Date    BACK SURGERY      lumbar x 2, hardware, cages, fusion    CHOLECYSTECTOMY      COLONOSCOPY      greater than 2014    COLONOSCOPY N/A 12/10/2024    COLONOSCOPY POLYPECTOMY SNARE/BIOPSY performed by Maury Ozuna MD at [facility] OR    HEMORRHOID SURGERY      NASAL POLYP SURGERY      had at least 4 nasal surgeries to remove polyps    SINUS ENDOSCOPY N/A 05/29/2024    SINUS ENDOSCOPY FUNCTIONAL SURGERY- WITH NAVIGATION- ETHMOIDECTOMY, MAXILLARY ANTROSTOMY, FRONTAL SINUS EXPLORATION, SPHENOIDOTOMY, POLPYTECTOMY performed by Monet Mcpherson MD at [facility] OR    TONSILLECTOMY AND ADENOIDECTOMY      TUBAL LIGATION      WISDOM TOOTH EXTRACTION                Not in a hospital admission.  No Known Allergies  Social History     Tobacco Use   Smoking Status Former    Current packs/day: 0.50    Average packs/day: 0.5 packs/day for 30.0 years (15.0 ttl pk-yrs)    Types: Cigarettes   Smokeless Tobacco Never     Prior to Admission medications    Medication Sig Start Date End Date Taking? Authorizing Provider   metoprolol tartrate (LOPRESSOR) 25 MG tablet Take 1 tablet by mouth 2 times daily   Yes Provider, MD Cedrick   aspirin 81 MG EC tablet Take 1 tablet by mouth daily 11/14/24  Yes Chon Mart MD   isosorbide mononitrate (IMDUR) 30 MG extended release tablet Take 1 tablet by mouth daily 11/14/24  Yes Chon Mart MD   nitroGLYCERIN (NITROSTAT) 0.4 MG SL tablet Place 1 tablet under the tongue every 5 minutes as needed for Chest pain up to max of 3 total doses. If no relief after 1 dose, call 911. 11/14/24  Yes Chon Mart MD   atorvastatin (LIPITOR) 20 MG tablet Take 1 tablet by mouth nightly 11/14/24  Yes Chon Mart MD   pantoprazole (PROTONIX) 40 MG tablet Take 1 tablet by mouth every morning (before breakfast) 11/15/24  Yes Chon Mart MD   albuterol sulfate HFA (PROVENTIL;VENTOLIN;PROAIR) 108 (90

## 2024-12-22 ENCOUNTER — HOSPITAL ENCOUNTER (OUTPATIENT)
Dept: SLEEP CENTER | Age: 54
Discharge: HOME | End: 2024-12-24
Attending: INTERNAL MEDICINE
Payer: COMMERCIAL

## 2024-12-22 DIAGNOSIS — G47.30 SLEEP-RELATED BREATHING DISORDER: ICD-10-CM

## 2024-12-22 DIAGNOSIS — E66.9 OBESITY (BMI 35.0-39.9 WITHOUT COMORBIDITY): ICD-10-CM

## 2024-12-22 PROCEDURE — 95810 POLYSOM 6/> YRS 4/> PARAM: CPT

## 2024-12-23 VITALS
OXYGEN SATURATION: 88 % | RESPIRATION RATE: 16 BRPM | HEIGHT: 66 IN | BODY MASS INDEX: 44.2 KG/M2 | HEART RATE: 81 BPM | WEIGHT: 275 LBS

## 2024-12-23 ASSESSMENT — SLEEP AND FATIGUE QUESTIONNAIRES
HOW LIKELY ARE YOU TO NOD OFF OR FALL ASLEEP WHILE SITTING AND TALKING TO SOMEONE: WOULD NEVER DOZE
HOW LIKELY ARE YOU TO NOD OFF OR FALL ASLEEP WHILE SITTING INACTIVE IN A PUBLIC PLACE: WOULD NEVER DOZE
HOW LIKELY ARE YOU TO NOD OFF OR FALL ASLEEP WHILE WATCHING TV: HIGH CHANCE OF DOZING
HOW LIKELY ARE YOU TO NOD OFF OR FALL ASLEEP WHILE LYING DOWN TO REST IN THE AFTERNOON WHEN CIRCUMSTANCES PERMIT: HIGH CHANCE OF DOZING
HOW LIKELY ARE YOU TO NOD OFF OR FALL ASLEEP WHILE SITTING AND READING: MODERATE CHANCE OF DOZING
ESS TOTAL SCORE: 9
HOW LIKELY ARE YOU TO NOD OFF OR FALL ASLEEP WHEN YOU ARE A PASSENGER IN A CAR FOR AN HOUR WITHOUT A BREAK: WOULD NEVER DOZE
HOW LIKELY ARE YOU TO NOD OFF OR FALL ASLEEP WHILE SITTING QUIETLY AFTER LUNCH WITHOUT ALCOHOL: SLIGHT CHANCE OF DOZING
HOW LIKELY ARE YOU TO NOD OFF OR FALL ASLEEP IN A CAR, WHILE STOPPED FOR A FEW MINUTES IN TRAFFIC: WOULD NEVER DOZE

## 2025-01-02 ENCOUNTER — OFFICE VISIT (OUTPATIENT)
Age: 55
End: 2025-01-02

## 2025-01-02 VITALS
OXYGEN SATURATION: 90 % | DIASTOLIC BLOOD PRESSURE: 78 MMHG | WEIGHT: 269 LBS | TEMPERATURE: 98.2 F | BODY MASS INDEX: 43.23 KG/M2 | HEIGHT: 66 IN | SYSTOLIC BLOOD PRESSURE: 132 MMHG | HEART RATE: 88 BPM

## 2025-01-02 DIAGNOSIS — Z98.890 STATUS POST COLONOSCOPY WITH POLYPECTOMY: ICD-10-CM

## 2025-01-02 DIAGNOSIS — K63.5 BENIGN COLON POLYP: ICD-10-CM

## 2025-01-02 DIAGNOSIS — K57.90 DIVERTICULOSIS: ICD-10-CM

## 2025-01-02 DIAGNOSIS — Q43.8 REDUNDANT COLON: Primary | ICD-10-CM

## 2025-01-02 RX ORDER — DUPILUMAB 300 MG/2ML
300 INJECTION, SOLUTION SUBCUTANEOUS ONCE
COMMUNITY

## 2025-01-02 ASSESSMENT — ENCOUNTER SYMPTOMS
COUGH: 0
SHORTNESS OF BREATH: 0
SORE THROAT: 0
RHINORRHEA: 0

## 2025-01-02 NOTE — PROGRESS NOTES
[facility] General Surgery   Maury Ozuna MD, FACS  Kate MRaisa Aravind, APRN-CNP  [address], [address]  [place] [address]  P: [phone], F: [phone]    General and Robotic Surgery  Endoscopy Follow-Up Visit Note               PATIENT NAME: Dannielle Bustillo   :  1970   MRN: [id number]   PCP:  Gil Black MD     TODAY'S DATE: 2025    Chief Complaint   Patient presents with    Post-Op Check     Patient s/p 3 weeks from cscope done on 12/10/2024. States doing well overall, eating drinking and normal bowel movements. No new concerns. Here to discuss results. No new concerns.        HISTORY OF PRESENT ILLNESS: 54 y.o. female presents for follow up on colonoscopy findings completed on December 10, 2024. Patient tolerated procedure well, no rectal bleeding or pain noted after procedure.    Findings per Dr. Ozuna's procedure note are reviewed below:  Terminal ileum: normal     Cecum/Ascending colon: normal     Transverse colon: normal     Descending/Sigmoid colon: Grossly normal but very redundant sigmoid     Rectum/Anus: examined in normal and retroflexed positions and was abnormal: Prominent internal/external hemorrhoids.  Low rectal polyp seen upon retroflexion removed and retrieved with hot snare polypectomy technique and sent to pathology    Pathology:  RECTUM, POLYPECTOMY:   -CAUTERIZED POLYP   -SEE COMMENT     -- Diagnosis Comment --   Optimal interpretation of the biopsy specimen is precluded by   thermocautery artifact.  Focal features suggestive of a hyperplastic   polyp are noted.  Within the observed sections, there is no high-grade   dysplasia or malignancy identified.     PAST MEDICAL HISTORY     Past Medical History:   Diagnosis Date    DDD (degenerative disc disease), cervical     DDD (degenerative disc disease), lumbar     Hypertension     Resolved, no meds currently    Loss of perception for taste     loss of smell    Nasal congestion     Nasal polyps

## 2025-04-21 ENCOUNTER — OFFICE VISIT (OUTPATIENT)
Dept: PULMONOLOGY | Age: 55
End: 2025-04-21
Payer: COMMERCIAL

## 2025-04-21 VITALS
RESPIRATION RATE: 12 BRPM | HEART RATE: 90 BPM | HEIGHT: 66 IN | OXYGEN SATURATION: 98 % | BODY MASS INDEX: 44.2 KG/M2 | WEIGHT: 275 LBS | SYSTOLIC BLOOD PRESSURE: 139 MMHG | DIASTOLIC BLOOD PRESSURE: 87 MMHG

## 2025-04-21 DIAGNOSIS — J33.9 NASAL POLYPOSIS: ICD-10-CM

## 2025-04-21 DIAGNOSIS — G47.33 OSA (OBSTRUCTIVE SLEEP APNEA): ICD-10-CM

## 2025-04-21 DIAGNOSIS — J33.9 TRIAD ASTHMA: Primary | ICD-10-CM

## 2025-04-21 DIAGNOSIS — Z88.6 TRIAD ASTHMA: Primary | ICD-10-CM

## 2025-04-21 DIAGNOSIS — J45.909 TRIAD ASTHMA: Primary | ICD-10-CM

## 2025-04-21 PROCEDURE — 99214 OFFICE O/P EST MOD 30 MIN: CPT | Performed by: INTERNAL MEDICINE

## 2025-04-21 PROCEDURE — 3079F DIAST BP 80-89 MM HG: CPT | Performed by: INTERNAL MEDICINE

## 2025-04-21 PROCEDURE — 3075F SYST BP GE 130 - 139MM HG: CPT | Performed by: INTERNAL MEDICINE

## 2025-04-21 RX ORDER — TRAZODONE HYDROCHLORIDE 50 MG/1
50 TABLET ORAL NIGHTLY
COMMUNITY

## 2025-04-21 NOTE — PROGRESS NOTES
Dannielle Bustillo  4/21/2025  Chief Complaint   Patient presents with    Asthma     Follow up  Has appt for CPAP June 12th        Dannielle Bustillo  54 y.o. female   History of Present Illness  The patient presents for evaluation of asthma and sleep apnea.    She has been under the care of an ENT specialist, with no current evidence of polyps. Her treatment regimen includes twice-daily rinses and the use of a Symbicort inhaler. She reports satisfactory progress with this regimen, although she experienced mild shortness of breath during a recent dance event. She also notes a perceptible change in her breathing pattern when exposed to heat. She has undergone 4 surgical procedures for polyp removal, with the most recent one being approximately a year ago. She was previously on Dupixent but discontinued it due to financial constraints, with the last dose administered on 04/07/2025. She has not yet informed her ENT specialist about this discontinuation. She also utilizes an albuterol inhaler as needed.    She has completed a sleep study and is scheduled to receive her CPAP machine on 06/12/2025.    MEDICATIONS  Symbicort, albuterol inhaler, Singulair, Dupixent (discontinued).               Review of Systems -  General ROS: negative for - chills, fatigue, fever or weight loss  ENT ROS: negative for - headaches, oral lesions or sore throat  Cardiovascular ROS: no chest pain , orthopnea or pnd   Gastrointestinal ROS: no abdominal pain, change in bowel habits, or black or bloody stools  Skin - no rash   Neuro - no blurry vision , no loc . No focal weakness   msk - no jt tenderness or swelling    Vascular - no claudication , rest completed and negative   Lymphatic - complete and negative   Hematology - oncology - complete and negative   Allergy immunology - complete and negative    no burning or hematuria       LUNG CANCER SCREENING     CRITERIA MET    []     CT ORDERED  []      CRITERIA NOT MET   []      REFUSED

## 2025-07-29 ENCOUNTER — TRANSCRIBE ORDERS (OUTPATIENT)
Dept: ADMINISTRATIVE | Age: 55
End: 2025-07-29

## 2025-07-29 DIAGNOSIS — R20.2 BILATERAL ARM NUMBNESS AND TINGLING WHILE SLEEPING: Primary | ICD-10-CM

## 2025-07-29 DIAGNOSIS — R20.0 BILATERAL ARM NUMBNESS AND TINGLING WHILE SLEEPING: Primary | ICD-10-CM

## (undated) DEVICE — ENDO KIT W/SYRINGE: Brand: MEDLINE INDUSTRIES, INC.

## (undated) DEVICE — ERBE NESSY® OMEGA PLATE USA (85+23)CM² , WITH CABLE 3 M: Brand: ERBE

## (undated) DEVICE — Device

## (undated) DEVICE — BLADE 1884016HR RAD60 5PK M4 4MM ROTATE: Brand: RAD

## (undated) DEVICE — BLADE 1884380EM QUADCUT 4.3MMX13CM ROHS: Brand: ROTATABLE FUSION®

## (undated) DEVICE — SOCK SPEC L9IN WHT UNIV W/ STD PRT FOR FLD MGMT

## (undated) DEVICE — GLOVE ORANGE PI 7 1/2   MSG9075

## (undated) DEVICE — SPONGE,NEURO,0.5"X3",XR,STRL,LF,10/PK: Brand: MEDLINE

## (undated) DEVICE — ANES EXTENSION SET 90IN-LF: Brand: MEDLINE INDUSTRIES, INC.

## (undated) DEVICE — CORD,CAUTERY,BIPOLAR,STERILE: Brand: MEDLINE

## (undated) DEVICE — KIT CLN UP LIN W/ STD SAHARA TBL SHT 40X60IN DRAW/LIFT SHT

## (undated) DEVICE — SOLUTION IRRIG 3000ML 0.9% SOD CHL USP UROMATIC PLAS CONT

## (undated) DEVICE — FIRM 8CM: Brand: NASOPORE

## (undated) DEVICE — DEFENDO AIR WATER SUCTION AND BIOPSY VALVE KIT FOR  OLYMPUS: Brand: DEFENDO AIR/WATER/SUCTION AND BIOPSY VALVE

## (undated) DEVICE — MEDICINE CUP, GRADUATED, STER: Brand: MEDLINE

## (undated) DEVICE — POUCH INSTR W6.75XL11.5IN FRST 2 PKT ADH FOR ORTH AND

## (undated) DEVICE — 4-PORT MANIFOLD: Brand: NEPTUNE 2

## (undated) DEVICE — SOLUTION IRRIGATION STRL H2O 1000 ML UROMATIC CONTAINER

## (undated) DEVICE — SPONGE GZ W2XL2IN NONWOVEN 4 PLY FASTER WICKING ABIL AVANT

## (undated) DEVICE — INSTRUMENT TRACKER 9733533XOM ENT 1PK

## (undated) DEVICE — TUBING 1895522 5PK STRAIGHTSHOT TO XPS: Brand: STRAIGHTSHOT®

## (undated) DEVICE — SET ADMIN 25ML L117IN PMP MOD CK VLV RLER CLMP 2 SMRTSITE

## (undated) DEVICE — SOLUTION IV 1000ML 0.9% SOD CHL PH 5 INJ USP VIAFLX PLAS

## (undated) DEVICE — PATIENT TRACKER 9734887XOM NON-INVASIVE

## (undated) DEVICE — STAZ ENT: Brand: MEDLINE INDUSTRIES, INC.

## (undated) DEVICE — DRAPE,EENT,SPLIT,STERILE: Brand: MEDLINE

## (undated) DEVICE — WIPE INSTR W73XL73CM VISITEC

## (undated) DEVICE — GLOVE SURG SZ 7 CRM LTX FREE POLYISOPRENE POLYMER BEAD ANTI

## (undated) DEVICE — SNARE ENDOSCP L240CM LOOP W13MM DIA2.4MM SHT THROW SM OVL

## (undated) DEVICE — KIT,ANTI FOG,W/SPONGE & FLUID,SOFT PACK: Brand: MEDLINE

## (undated) DEVICE — POLYP TRAP: Brand: TRAPEASE®